# Patient Record
Sex: FEMALE | Race: WHITE | NOT HISPANIC OR LATINO | URBAN - NONMETROPOLITAN AREA
[De-identification: names, ages, dates, MRNs, and addresses within clinical notes are randomized per-mention and may not be internally consistent; named-entity substitution may affect disease eponyms.]

---

## 2017-01-01 ENCOUNTER — HOSPITAL ENCOUNTER (OUTPATIENT)
Dept: PHYSICAL THERAPY | Facility: HOSPITAL | Age: 47
Setting detail: THERAPIES SERIES
Discharge: HOME | End: 2017-01-20
Attending: ORTHOPAEDIC SURGERY | Admitting: ORTHOPAEDIC SURGERY

## 2017-01-09 ENCOUNTER — OFFICE VISIT (OUTPATIENT)
Dept: PAIN MEDICINE | Facility: CLINIC | Age: 47
End: 2017-01-09

## 2017-01-09 VITALS
SYSTOLIC BLOOD PRESSURE: 110 MMHG | DIASTOLIC BLOOD PRESSURE: 78 MMHG | WEIGHT: 163.8 LBS | BODY MASS INDEX: 24.26 KG/M2 | HEIGHT: 69 IN

## 2017-01-09 DIAGNOSIS — M25.561 RIGHT KNEE PAIN, UNSPECIFIED CHRONICITY: ICD-10-CM

## 2017-01-09 DIAGNOSIS — Z96.651 HX OF TOTAL KNEE REPLACEMENT, RIGHT: ICD-10-CM

## 2017-01-09 DIAGNOSIS — M79.18 MYOFACIAL MUSCLE PAIN: ICD-10-CM

## 2017-01-09 DIAGNOSIS — M47.817 LUMBOSACRAL SPONDYLOSIS WITHOUT MYELOPATHY: Primary | ICD-10-CM

## 2017-01-09 PROBLEM — Z96.659 HX OF TOTAL KNEE REPLACEMENT: Status: ACTIVE | Noted: 2017-01-09

## 2017-01-09 PROCEDURE — 99214 OFFICE O/P EST MOD 30 MIN: CPT | Performed by: NURSE PRACTITIONER

## 2017-01-09 RX ORDER — ERGOCALCIFEROL 1.25 MG/1
CAPSULE ORAL
Status: ON HOLD | COMMUNITY
Start: 2016-10-20 | End: 2021-03-15

## 2017-01-09 RX ORDER — CYCLOBENZAPRINE HCL 10 MG
10 TABLET ORAL 2 TIMES DAILY PRN
Qty: 60 TABLET | Refills: 2 | Status: SHIPPED | OUTPATIENT
Start: 2017-01-09 | End: 2017-06-02 | Stop reason: SDUPTHER

## 2017-01-09 RX ORDER — ZOLPIDEM TARTRATE 10 MG/1
TABLET ORAL
Status: ON HOLD | COMMUNITY
Start: 2016-12-01 | End: 2021-03-15

## 2017-01-09 RX ORDER — HYDROCODONE BITARTRATE AND ACETAMINOPHEN 5; 325 MG/1; MG/1
1 TABLET ORAL EVERY 6 HOURS PRN
COMMUNITY
End: 2021-03-17 | Stop reason: HOSPADM

## 2017-01-09 NOTE — MR AVS SNAPSHOT
Georgia Loyola   1/9/2017 11:20 AM   Office Visit    Dept Phone:  [phone]   Encounter #:  [id number]    Provider:  Tito Gonzalez MD   Department:  [facility] PAIN MANAGEMENT                Your Full Care Plan              Where to Get Your Medications      These medications were sent to [facility] - [place], [place] - [address] [place] [address] - [phone]  - [phone] [address], [facility] [address]     Phone:  [phone]     cyclobenzaprine 10 MG tablet            Your Updated Medication List          This list is accurate as of: 1/9/17 12:41 PM.  Always use your most recent med list.                cyclobenzaprine 10 MG tablet   Commonly known as:  FLEXERIL   Take 1 tablet by mouth 2 (Two) Times a Day As Needed for muscle spasms.       * HYDROcodone-acetaminophen 7.5-325 MG per tablet   Commonly known as:  NORCO       * HYDROcodone-acetaminophen 5-325 MG per tablet   Commonly known as:  NORCO       vitamin D 58588 UNITS capsule capsule   Commonly known as:  ERGOCALCIFEROL       zolpidem 10 MG tablet   Commonly known as:  AMBIEN       * Notice:  This list has 2 medication(s) that are the same as other medications prescribed for you. Read the directions carefully, and ask your doctor or other care provider to review them with you.            You Were Diagnosed With        Codes Comments    Lumbosacral spondylosis without myelopathy    -  Primary ICD-10-CM: M47.817  ICD-9-CM: 721.3     Myofacial muscle pain     ICD-10-CM: M79.1  ICD-9-CM: 729.1     Right knee pain, unspecified chronicity     ICD-10-CM: M25.561  ICD-9-CM: 719.46     Hx of total knee replacement, right     ICD-10-CM: Z96.651  ICD-9-CM: V43.65       Instructions     None    Patient Instructions History      Upcoming Appointments     Visit Type Date Time Department    FOLLOW UP 1/9/2017 11:20 AM [facility] PAIN [facility]    FOLLOW UP 2/7/2017 11:00 AM [facility]  "PAIN [facility]      MyChart Signup     Our [facility] that [facility]. If you would like to sign up for [facility], please email [facility] ([email or web address] or call [phone] to obtain an activation code.             Other Info from Your Visit           Your Appointments     Feb 07, 2017 11:00 AM CST   Follow Up with Tito Gonzalez MD   [facility]IN [facility] (--)    [address]  [facility] [address] [place] [id number] [phone]           Arrive 15 minutes prior to appointment.              Allergies     Toradol [Ketorolac Tromethamine]  Rash      Reason for Visit     Neck Pain     Back Pain lower    Hip Pain bilateral     Knee Pain bilateral    Foot Pain right      Vital Signs     Blood Pressure Height Weight Body Mass Index Smoking Status       110/78 (BP Location: Left arm, Patient Position: Sitting, Cuff Size: Adult) 69\" (175.3 cm) 163 lb 12.8 oz (74.3 kg) 24.19 kg/m2 Current Every Day Smoker       Problems and Diagnoses Noted     History of total knee replacement    Arthritis of low back    Muscle pain    Right knee pain        "

## 2017-01-09 NOTE — PROGRESS NOTES
Georgia Loyola is a 46 y.o. female.   1970    HPI:   Location: neck, lower back, bilateral hip, bilateral knee and right foot  Quality: burning, shooting, aching and throbbing  Severity: 8/10  Timing: constant  Alleviating: pain medication and injection  Aggravating: increased activity and weather    Pt needed PA and Urine to obtain RX of Norco 5mg x qid last visit.  Patient is to recover from right knee replacement surgery.  Patient states physical therapy has ended but she continues to physical therapy at home.  Right knee with approximated wound decreasing edema and patient is ambulating with a when necessary Cane.  Significant findings we will reduce vocal medication 5 mg 3 times a day to provide for better efficacy patient is in agreement with this after discussion Dr. Tito Gonzalez.  Medications providing enough relief of daily activity and ambulation no side effects are noted.  We will refill Flexeril at this present time.    The following portions of the patient's history were reviewed by me and updated as appropriate: allergies, current medications, past family history, past medical history, past social history, past surgical history and problem list.    Past Medical History   Diagnosis Date   • Anxiety    • Headache, tension-type    • Osteoarthritis        Social History     Social History   • Marital status:      Spouse name: N/A   • Number of children: N/A   • Years of education: N/A     Occupational History   • Not on file.     Social History Main Topics   • Smoking status: Current Every Day Smoker     Types: Cigarettes   • Smokeless tobacco: Not on file      Comment: 4 a day   • Alcohol use No   • Drug use: No   • Sexual activity: Defer     Other Topics Concern   • Not on file     Social History Narrative       Family History   Problem Relation Age of Onset   • Cancer Mother    • Diabetes Mother    • Early death Mother    • Coronary artery disease Mother    • Hyperlipidemia Father     • Hypertension Father          Current Outpatient Prescriptions:   •  cyclobenzaprine (FLEXERIL) 10 MG tablet, Take 1 tablet by mouth 2 (Two) Times a Day As Needed for muscle spasms., Disp: 60 tablet, Rfl: 2  •  HYDROcodone-acetaminophen (NORCO) 5-325 MG per tablet, Take 1 tablet by mouth Every 6 (Six) Hours As Needed., Disp: , Rfl:   •  vitamin D (ERGOCALCIFEROL) 57559 UNITS capsule capsule, , Disp: , Rfl:   •  zolpidem (AMBIEN) 10 MG tablet, , Disp: , Rfl:   •  HYDROcodone-acetaminophen (NORCO) 7.5-325 MG per tablet, Take 1 tablet by mouth Every 6 (Six) Hours As Needed for moderate pain (4-6)., Disp: , Rfl:     Allergies   Allergen Reactions   • Toradol [Ketorolac Tromethamine] Rash         Review of Systems   Musculoskeletal: Positive for back pain (lower) and neck pain.        Bilateral hip pain  Bilateral knee pain  Right foot pain       10 system review of systems was reviewed and negative except for above.    Physical Exam   Constitutional: She is oriented to person, place, and time. She appears well-developed and well-nourished. No distress.   Musculoskeletal:        Right knee: She exhibits decreased range of motion (slight decrease in ROM, post surgically) and swelling ( minimal swelling). She exhibits no erythema. Tenderness ( slight tenderness) found.        Lumbar back: She exhibits decreased range of motion (flex 60 deg and 10 deg ext with mild facet loading  ).        Legs:  Right knee post surgical scar well approximated   Neurological: She is alert and oriented to person, place, and time. No sensory deficit. Gait (PRN CANE, non today) abnormal.   Reflex Scores:       Tricep reflexes are 2+ on the right side and 2+ on the left side.       Bicep reflexes are 2+ on the right side and 2+ on the left side.       Brachioradialis reflexes are 2+ on the right side and 2+ on the left side.       Patellar reflexes are 1+ on the left side.       Achilles reflexes are 1+ on the left side.  Skin: Skin is warm  and dry.   Psychiatric: She has a normal mood and affect. Her behavior is normal. Judgment normal.       Georgia was seen today for neck pain, back pain, hip pain, knee pain and foot pain.    Diagnoses and all orders for this visit:    Lumbosacral spondylosis without myelopathy  -     cyclobenzaprine (FLEXERIL) 10 MG tablet; Take 1 tablet by mouth 2 (Two) Times a Day As Needed for muscle spasms.    Myofacial muscle pain  -     cyclobenzaprine (FLEXERIL) 10 MG tablet; Take 1 tablet by mouth 2 (Two) Times a Day As Needed for muscle spasms.    Right knee pain, unspecified chronicity  -     cyclobenzaprine (FLEXERIL) 10 MG tablet; Take 1 tablet by mouth 2 (Two) Times a Day As Needed for muscle spasms.    Hx of total knee replacement, right        Medication: Patient reports no negative side effects, Patient reports appropriate usage and storage habits and Patient's opioid provides enough reflief to be more active and perform activities of daily living with less discomfort. after discussion with patient and Dr. Tito Gonzalez Norco 5 mg 3 times a day for 1 month  given this is a reduction in dosage to better provide efficacy with improving right knee postop replacement and lower back chronic pain.     Interventional: none at this time. Follow up with Dr. Edouard, no manipulation needed with last visit. Released to go back to work Jan. 5, [employer].     Rehab: Pt has completed Right knee therapy on right knee. Pt does Home therapy for right knee.     Behavioral: No aberrant behavior noted. [facility] Report # [id number] was reviewed and is consistent with stated history    Urine drug screen None at this time.  We'll consider obtaining just next visit.          This document has been electronically signed by PRAVEEN Choi on January 9, 2017 3:30 PM          This document has been electronically signed by PRAVEEN Choi on January 9, 2017 3:30 PM

## 2017-01-12 NOTE — PROGRESS NOTES
Addendum:  I have reviewed this patient with PRAVEEN Evans.  I agree with the above findings and plan.

## 2017-02-07 ENCOUNTER — OFFICE VISIT (OUTPATIENT)
Dept: PAIN MEDICINE | Facility: CLINIC | Age: 47
End: 2017-02-07

## 2017-02-07 VITALS
DIASTOLIC BLOOD PRESSURE: 88 MMHG | WEIGHT: 162.3 LBS | BODY MASS INDEX: 24.04 KG/M2 | SYSTOLIC BLOOD PRESSURE: 116 MMHG | HEIGHT: 69 IN

## 2017-02-07 DIAGNOSIS — M47.817 LUMBOSACRAL SPONDYLOSIS WITHOUT MYELOPATHY: Primary | ICD-10-CM

## 2017-02-07 DIAGNOSIS — G89.29 CHRONIC PAIN OF RIGHT KNEE: ICD-10-CM

## 2017-02-07 DIAGNOSIS — M25.561 CHRONIC PAIN OF RIGHT KNEE: ICD-10-CM

## 2017-02-07 DIAGNOSIS — Z96.651 HX OF TOTAL KNEE REPLACEMENT, RIGHT: ICD-10-CM

## 2017-02-07 DIAGNOSIS — M79.18 MYOFACIAL MUSCLE PAIN: ICD-10-CM

## 2017-02-07 PROCEDURE — 99214 OFFICE O/P EST MOD 30 MIN: CPT | Performed by: NURSE PRACTITIONER

## 2017-02-07 RX ORDER — OMEPRAZOLE 20 MG/1
CAPSULE, DELAYED RELEASE ORAL
Status: ON HOLD | COMMUNITY
Start: 2016-11-01 | End: 2021-03-15

## 2017-02-07 NOTE — PROGRESS NOTES
"Georgia Loyola is a 46 y.o. female.   1970    HPI:   Location: neck, lower back, bilateral hip and bilateral knee and righ tfoot  Quality: burning, shooting, aching and throbbing  Severity: 8/10  Timing: constant  Alleviating: pain medication and and injections  Aggravating: increased activity and weather      Pt remains recovering from right knee replacement. Pt back to work. Pt with upcoming orthro appt March, pt states continuing right knee stretches. Pt states \"right knee remains limited\". Pt states working conditions are causing more pain and requests change in opiate medications.  Opiate medication providing enough relief for daily activities and work issues however duration remains minimal, we will increase medication Norco 5 mg 4 times a day.  No side effects are noted.  Patient happy with pain management regimen and treatment.    The following portions of the patient's history were reviewed by me and updated as appropriate: allergies, current medications, past family history, past medical history, past social history, past surgical history and problem list.    Past Medical History   Diagnosis Date   • Anxiety    • Headache, tension-type    • Osteoarthritis        Social History     Social History   • Marital status:      Spouse name: N/A   • Number of children: N/A   • Years of education: N/A     Occupational History   • Not on file.     Social History Main Topics   • Smoking status: Current Every Day Smoker     Types: Cigarettes   • Smokeless tobacco: Not on file      Comment: 4 a day   • Alcohol use No   • Drug use: No   • Sexual activity: Defer     Other Topics Concern   • Not on file     Social History Narrative       Family History   Problem Relation Age of Onset   • Cancer Mother    • Diabetes Mother    • Early death Mother    • Coronary artery disease Mother    • Hyperlipidemia Father    • Hypertension Father          Current Outpatient Prescriptions:   •  cyclobenzaprine " (FLEXERIL) 10 MG tablet, Take 1 tablet by mouth 2 (Two) Times a Day As Needed for muscle spasms., Disp: 60 tablet, Rfl: 2  •  HYDROcodone-acetaminophen (NORCO) 5-325 MG per tablet, Take 1 tablet by mouth Every 6 (Six) Hours As Needed., Disp: , Rfl:   •  omeprazole (priLOSEC) 20 MG capsule, , Disp: , Rfl:   •  vitamin D (ERGOCALCIFEROL) 04464 UNITS capsule capsule, , Disp: , Rfl:   •  HYDROcodone-acetaminophen (NORCO) 7.5-325 MG per tablet, Take 1 tablet by mouth Every 6 (Six) Hours As Needed for moderate pain (4-6)., Disp: , Rfl:   •  zolpidem (AMBIEN) 10 MG tablet, , Disp: , Rfl:     Allergies   Allergen Reactions   • Toradol [Ketorolac Tromethamine] Rash         Review of Systems   Musculoskeletal: Positive for back pain (lower) and neck pain.        Bilateral hip pain bilateral knee pain and right foot pain     10 system review of systems was reviewed and negative except for above.    Physical Exam   Constitutional: She is oriented to person, place, and time. She appears well-developed and well-nourished. No distress.   Eyes: EOM are normal. Pupils are equal, round, and reactive to light.   Musculoskeletal:        Right knee: She exhibits decreased range of motion (slight decrease in ROM, post surgically ) and swelling (minute swelling ). She exhibits no erythema. Tenderness ( slight tenderness ) found.        Lumbar back: She exhibits decreased range of motion (flex 60 deg and 10 deg ext with mild facet loading   ).        Legs:  Right knee wound healed   Neurological: She is alert and oriented to person, place, and time. No sensory deficit. Gait (PRN CANE, non today) abnormal.   Reflex Scores:       Tricep reflexes are 2+ on the right side and 2+ on the left side.       Bicep reflexes are 2+ on the right side and 2+ on the left side.       Brachioradialis reflexes are 2+ on the right side and 2+ on the left side.       Patellar reflexes are 0 on the right side and 1+ on the left side.       Achilles reflexes  "are 1+ on the right side and 1+ on the left side.  Skin: Skin is warm and dry.   Psychiatric: She has a normal mood and affect. Her behavior is normal. Judgment normal.   Vitals reviewed.      Georgia was seen today for neck pain, back pain, hip pain and knee pain.    Diagnoses and all orders for this visit:    Lumbosacral spondylosis without myelopathy    Myofacial muscle pain    Hx of total knee replacement, right    Chronic pain of right knee        Medication: Patient reports no negative side effects, Patient reports appropriate usage and storage habits, Patient's opioid provides enough reflief to be more active and perform activities of daily living with less discomfort. and Refill opioid medication as above. Flexeril providing enough relief for muscle spasms.  Patient states she is back at work and her duration medication is not lasting for only.  Discussed case Dr. Tito Partida, Wright 5 mg increased to 4 times a day, this is refilled per Dr. Brad Partida's 2 scripts.    Interventional: Pt released from PT, and continues to work. Pt with March appt with Javan, and right knee remains with limited mobility---no cane needed for ambulation. Smoking cessation discussed.  Patient started back to work we will increase her Norco 5 mg 4 times a day at this present time, patient understands that at some point this may be produced to original dosage and duration.     Rehab: Pt remains knee exercises at home and works 3 days a week.     Behavioral: No aberrant behavior noted. [facility] Report # [id number]  was reviewed and is consistent with stated history. Pt with recent loss of father and maintains \"doing well\"    Urine drug screen None at this time.          This document has been electronically signed by PRAVEEN Choi on February 7, 2017 7:32 PM          This document has been electronically signed by PRAVEEN Choi on February 7, 2017 7:32 PM     "

## 2017-02-17 NOTE — PROGRESS NOTES
Addendum:  I have reviewed this patient with PRAVEEN Evans.  I agree with the above findings and plan.  I have personally spoken with the patient today, and confirmed key findings.

## 2017-03-27 ENCOUNTER — OFFICE VISIT (OUTPATIENT)
Dept: PAIN MEDICINE | Facility: CLINIC | Age: 47
End: 2017-03-27

## 2017-03-27 VITALS
WEIGHT: 161.3 LBS | BODY MASS INDEX: 23.89 KG/M2 | DIASTOLIC BLOOD PRESSURE: 88 MMHG | HEIGHT: 69 IN | SYSTOLIC BLOOD PRESSURE: 120 MMHG

## 2017-03-27 DIAGNOSIS — M25.561 CHRONIC PAIN OF RIGHT KNEE: Primary | ICD-10-CM

## 2017-03-27 DIAGNOSIS — M47.817 LUMBOSACRAL SPONDYLOSIS WITHOUT MYELOPATHY: ICD-10-CM

## 2017-03-27 DIAGNOSIS — G89.29 CHRONIC PAIN OF RIGHT KNEE: Primary | ICD-10-CM

## 2017-03-27 DIAGNOSIS — M79.18 MYOFASCIAL PAIN: ICD-10-CM

## 2017-03-27 PROCEDURE — 99213 OFFICE O/P EST LOW 20 MIN: CPT | Performed by: PAIN MEDICINE

## 2017-03-27 RX ORDER — HYDROCODONE BITARTRATE AND ACETAMINOPHEN 7.5; 325 MG/1; MG/1
1 TABLET ORAL 4 TIMES DAILY
Qty: 60 TABLET | Refills: 0 | Status: SHIPPED | OUTPATIENT
Start: 2017-03-27 | End: 2017-04-11

## 2017-03-27 RX ORDER — AMOXICILLIN AND CLAVULANATE POTASSIUM 875; 125 MG/1; MG/1
TABLET, FILM COATED ORAL
COMMUNITY
Start: 2017-03-22 | End: 2017-08-02

## 2017-03-27 NOTE — PROGRESS NOTES
Georgia Loyola is a 46 y.o. female.   1970    HPI:   Location: neck, lower back, bilateral hip, bilateral knee and right foot  Quality: burning, shooting, aching and throbbing  Severity: 8/10  Timing: constant  Alleviating: pain medication and injection  Aggravating: increased activity and weather     Patient reports that the opioid medication still provides her relief , however, not as good as previously.  It does allow increased activity than she would have without the opioid medication.  She denies side effects.  Still having knee pain.  Still seeing ortho.  They are working her up.        The following portions of the patient's history were reviewed by me and updated as appropriate: allergies, current medications, past family history, past medical history, past social history, past surgical history and problem list.    Past Medical History:   Diagnosis Date   • Anxiety    • Headache, tension-type    • Osteoarthritis        Social History     Social History   • Marital status:      Spouse name: N/A   • Number of children: N/A   • Years of education: N/A     Occupational History   • Not on file.     Social History Main Topics   • Smoking status: Current Every Day Smoker     Types: Cigarettes   • Smokeless tobacco: Not on file      Comment: 4 a day   • Alcohol use No   • Drug use: No   • Sexual activity: Defer     Other Topics Concern   • Not on file     Social History Narrative       Family History   Problem Relation Age of Onset   • Cancer Mother    • Diabetes Mother    • Early death Mother    • Coronary artery disease Mother    • Hyperlipidemia Father    • Hypertension Father          Current Outpatient Prescriptions:   •  amoxicillin-clavulanate (AUGMENTIN) 875-125 MG per tablet, , Disp: , Rfl:   •  cyclobenzaprine (FLEXERIL) 10 MG tablet, Take 1 tablet by mouth 2 (Two) Times a Day As Needed for muscle spasms., Disp: 60 tablet, Rfl: 2  •  HYDROcodone-acetaminophen (NORCO) 5-325 MG per  tablet, Take 1 tablet by mouth Every 6 (Six) Hours As Needed., Disp: , Rfl:   •  HYDROcodone-acetaminophen (NORCO) 7.5-325 MG per tablet, Take 1 tablet by mouth Every 6 (Six) Hours As Needed for moderate pain (4-6)., Disp: , Rfl:   •  omeprazole (priLOSEC) 20 MG capsule, , Disp: , Rfl:   •  vitamin D (ERGOCALCIFEROL) 60098 UNITS capsule capsule, , Disp: , Rfl:   •  zolpidem (AMBIEN) 10 MG tablet, , Disp: , Rfl:   •  HYDROcodone-acetaminophen (NORCO) 7.5-325 MG per tablet, Take 1 tablet by mouth 4 (Four) Times a Day for 30 days., Disp: 60 tablet, Rfl: 0  •  HYDROcodone-acetaminophen (NORCO) 7.5-325 MG per tablet, Take 1 tablet by mouth 4 (Four) Times a Day for 30 days., Disp: 60 tablet, Rfl: 0  •  HYDROcodone-acetaminophen (NORCO) 7.5-325 MG per tablet, Take 1 tablet by mouth 4 (Four) Times a Day for 30 days., Disp: 60 tablet, Rfl: 0  •  HYDROcodone-acetaminophen (NORCO) 7.5-325 MG per tablet, Take 1 tablet by mouth 4 (Four) Times a Day for 30 days., Disp: 60 tablet, Rfl: 0    Allergies   Allergen Reactions   • Toradol [Ketorolac Tromethamine] Rash         Review of Systems   Musculoskeletal: Positive for back pain (lower) and neck pain.        Bilateral hip knee pain and right foot pain     10 system review of systems was reviewed and negative except for above.    Physical Exam   Constitutional: She appears well-developed and well-nourished. No distress.   Musculoskeletal:   Full arom l knees with no pain    Right knee with flexion slightly limited with pain.   Neurological: She is alert.   Psychiatric: She has a normal mood and affect. Her behavior is normal. Judgment normal.       Georgia was seen today for neck pain, back pain, hip pain, knee pain and foot pain.    Diagnoses and all orders for this visit:    Chronic pain of right knee    Lumbosacral spondylosis without myelopathy    Myofascial pain    Other orders  -     HYDROcodone-acetaminophen (NORCO) 7.5-325 MG per tablet; Take 1 tablet by mouth 4 (Four)  Times a Day for 30 days.  -     HYDROcodone-acetaminophen (NORCO) 7.5-325 MG per tablet; Take 1 tablet by mouth 4 (Four) Times a Day for 30 days.  -     HYDROcodone-acetaminophen (NORCO) 7.5-325 MG per tablet; Take 1 tablet by mouth 4 (Four) Times a Day for 30 days.  -     HYDROcodone-acetaminophen (NORCO) 7.5-325 MG per tablet; Take 1 tablet by mouth 4 (Four) Times a Day for 30 days.      Medication: Patient reports no negative side effects, Patient reports appropriate usage and storage habits, Patient's opioid provides enough reflief to be more active and perform activities of daily living with less discomfort. and Refill opioid medication as above.  Refill with an increase from 5mg to 7.5 in order to increase efficacy.    Interventional: none at this time    Rehab: none at this time    Behavioral: No aberrant behavior noted. [facility] Report #[id number] was reviewed and is consistent with stated history    Urine drug screen None at this time          This document has been electronically signed by Tito Gonzalez MD on March 27, 2017 11:22 AM

## 2017-04-11 RX ORDER — HYDROCODONE BITARTRATE AND ACETAMINOPHEN 7.5; 325 MG/1; MG/1
1 TABLET ORAL 4 TIMES DAILY
Qty: 120 TABLET | Refills: 0 | Status: SHIPPED | OUTPATIENT
Start: 2017-04-11 | End: 2017-05-11

## 2017-06-02 ENCOUNTER — APPOINTMENT (OUTPATIENT)
Dept: LAB | Facility: HOSPITAL | Age: 47
End: 2017-06-02

## 2017-06-02 ENCOUNTER — OFFICE VISIT (OUTPATIENT)
Dept: PAIN MEDICINE | Facility: CLINIC | Age: 47
End: 2017-06-02

## 2017-06-02 VITALS
DIASTOLIC BLOOD PRESSURE: 80 MMHG | BODY MASS INDEX: 23.4 KG/M2 | HEIGHT: 69 IN | WEIGHT: 158 LBS | SYSTOLIC BLOOD PRESSURE: 140 MMHG

## 2017-06-02 DIAGNOSIS — Z79.899 HIGH RISK MEDICATIONS (NOT ANTICOAGULANTS) LONG-TERM USE: ICD-10-CM

## 2017-06-02 DIAGNOSIS — M25.561 RIGHT KNEE PAIN, UNSPECIFIED CHRONICITY: ICD-10-CM

## 2017-06-02 DIAGNOSIS — M47.817 LUMBOSACRAL SPONDYLOSIS WITHOUT MYELOPATHY: ICD-10-CM

## 2017-06-02 DIAGNOSIS — G89.29 CHRONIC PAIN OF RIGHT KNEE: Primary | ICD-10-CM

## 2017-06-02 DIAGNOSIS — M79.18 MYOFACIAL MUSCLE PAIN: ICD-10-CM

## 2017-06-02 DIAGNOSIS — M25.561 CHRONIC PAIN OF RIGHT KNEE: Primary | ICD-10-CM

## 2017-06-02 PROCEDURE — 80307 DRUG TEST PRSMV CHEM ANLYZR: CPT | Performed by: PAIN MEDICINE

## 2017-06-02 PROCEDURE — G0481 DRUG TEST DEF 8-14 CLASSES: HCPCS | Performed by: PAIN MEDICINE

## 2017-06-02 PROCEDURE — 99214 OFFICE O/P EST MOD 30 MIN: CPT | Performed by: NURSE PRACTITIONER

## 2017-06-02 RX ORDER — HYDROCODONE BITARTRATE AND ACETAMINOPHEN 7.5; 325 MG/1; MG/1
1 TABLET ORAL 4 TIMES DAILY
Qty: 120 TABLET | Refills: 0 | Status: SHIPPED | OUTPATIENT
Start: 2017-06-02 | End: 2017-07-02

## 2017-06-02 RX ORDER — CYCLOBENZAPRINE HCL 10 MG
10 TABLET ORAL 2 TIMES DAILY PRN
Qty: 60 TABLET | Refills: 2 | Status: SHIPPED | OUTPATIENT
Start: 2017-06-02 | End: 2017-10-03 | Stop reason: SDUPTHER

## 2017-06-02 NOTE — PROGRESS NOTES
Georgia Loyola is a 46 y.o. female.   1970    HPI:   Location: neck, lower back, bilateral hip, bilateral knee and right foot  Quality: burning, shooting, aching and throbbing  Severity: 8/10  Timing: constant  Alleviating: pain medication and injection  Aggravating: increased activity and weather    Still trying to be more active.  No side effects.  Patient reports that the opioid medication still provides her good relief and allows increased activity than she would have without the opioid medication.  She denies side effects.      The following portions of the patient's history were reviewed by me and updated as appropriate: allergies, current medications, past family history, past medical history, past social history, past surgical history and problem list.    Past Medical History:   Diagnosis Date   • Anxiety    • Headache, tension-type    • Osteoarthritis        Social History     Social History   • Marital status:      Spouse name: N/A   • Number of children: N/A   • Years of education: N/A     Occupational History   • Not on file.     Social History Main Topics   • Smoking status: Current Every Day Smoker     Types: Cigarettes   • Smokeless tobacco: Never Used      Comment: 4 a day   • Alcohol use No   • Drug use: No   • Sexual activity: Defer     Other Topics Concern   • Not on file     Social History Narrative       Family History   Problem Relation Age of Onset   • Cancer Mother    • Diabetes Mother    • Early death Mother    • Coronary artery disease Mother    • Hyperlipidemia Father    • Hypertension Father          Current Outpatient Prescriptions:   •  cyclobenzaprine (FLEXERIL) 10 MG tablet, Take 1 tablet by mouth 2 (Two) Times a Day As Needed for Muscle Spasms., Disp: 60 tablet, Rfl: 2  •  HYDROcodone-acetaminophen (NORCO) 7.5-325 MG per tablet, Take 1 tablet by mouth Every 6 (Six) Hours As Needed for moderate pain (4-6)., Disp: , Rfl:   •  omeprazole (priLOSEC) 20 MG capsule, ,  Disp: , Rfl:   •  vitamin D (ERGOCALCIFEROL) 08555 UNITS capsule capsule, , Disp: , Rfl:   •  amoxicillin-clavulanate (AUGMENTIN) 875-125 MG per tablet, , Disp: , Rfl:   •  HYDROcodone-acetaminophen (NORCO) 5-325 MG per tablet, Take 1 tablet by mouth Every 6 (Six) Hours As Needed., Disp: , Rfl:   •  HYDROcodone-acetaminophen (NORCO) 7.5-325 MG per tablet, Take 1 tablet by mouth 4 (Four) Times a Day for 30 days., Disp: 120 tablet, Rfl: 0  •  HYDROcodone-acetaminophen (NORCO) 7.5-325 MG per tablet, Take 1 tablet by mouth 4 (Four) Times a Day for 30 days., Disp: 120 tablet, Rfl: 0  •  zolpidem (AMBIEN) 10 MG tablet, , Disp: , Rfl:     Allergies   Allergen Reactions   • Toradol [Ketorolac Tromethamine] Rash       Review of Systems   Musculoskeletal: Positive for back pain and neck pain.        B.hip b.knee and r.foot pain     All other systems reviewed and are negative.    All systems reviewed and negative except for above.    Physical Exam   Constitutional: She appears well-developed and well-nourished. No distress.   Musculoskeletal:        Lumbar back: She exhibits decreased range of motion (flexion 45 deg and ext 5-10 deg) and tenderness.   Full arom l knees with no pain    Right knee with flexion and ext essentially full.    Neurological: She is alert.   Psychiatric: She has a normal mood and affect. Her behavior is normal. Judgment normal.       Georgia was seen today for back pain, neck pain and pain.    Diagnoses and all orders for this visit:    Chronic pain of right knee  -     ToxASSURE Select 13 (MW)    High risk medications (not anticoagulants) long-term use  -     ToxASSURE Select 13 (MW)    Lumbosacral spondylosis without myelopathy  -     ToxASSURE Select 13 (MW)  -     cyclobenzaprine (FLEXERIL) 10 MG tablet; Take 1 tablet by mouth 2 (Two) Times a Day As Needed for Muscle Spasms.    Myofacial muscle pain  -     cyclobenzaprine (FLEXERIL) 10 MG tablet; Take 1 tablet by mouth 2 (Two) Times a Day As Needed  for Muscle Spasms.    Right knee pain, unspecified chronicity  -     cyclobenzaprine (FLEXERIL) 10 MG tablet; Take 1 tablet by mouth 2 (Two) Times a Day As Needed for Muscle Spasms.    Other orders  -     HYDROcodone-acetaminophen (NORCO) 7.5-325 MG per tablet; Take 1 tablet by mouth 4 (Four) Times a Day for 30 days.  -     HYDROcodone-acetaminophen (NORCO) 7.5-325 MG per tablet; Take 1 tablet by mouth 4 (Four) Times a Day for 30 days.        Medication: Patient reports no negative side effects, Patient reports appropriate usage and storage habits, Patient's opioid provides enough reflief to be more active and perform activities of daily living with less discomfort. and Refill opioid medication as above.  Refill flexeril as well.    Interventional: none at this time.  May be candidate for lumbar interventions.  Still improving 6 mos after tkr.     Rehab: none at this time.  Remains active.     Behavioral: No aberrant behavior noted. [facility] Report #[id number]  was reviewed and is consistent with stated history    Urine drug screen Ordered today to test for drugs of abuse and prescribed medications.          This document has been electronically signed by Tito Gonzalez MD on June 2, 2017 11:09 AM

## 2017-06-09 LAB — CONV REPORT SUMMARY: NORMAL

## 2017-08-02 ENCOUNTER — OFFICE VISIT (OUTPATIENT)
Dept: PAIN MEDICINE | Facility: CLINIC | Age: 47
End: 2017-08-02

## 2017-08-02 VITALS
BODY MASS INDEX: 23.83 KG/M2 | HEIGHT: 69 IN | WEIGHT: 160.9 LBS | SYSTOLIC BLOOD PRESSURE: 120 MMHG | DIASTOLIC BLOOD PRESSURE: 64 MMHG

## 2017-08-02 DIAGNOSIS — M79.18 MYOFASCIAL PAIN: ICD-10-CM

## 2017-08-02 DIAGNOSIS — G89.29 CHRONIC PAIN OF RIGHT KNEE: Primary | ICD-10-CM

## 2017-08-02 DIAGNOSIS — Z79.899 HIGH RISK MEDICATIONS (NOT ANTICOAGULANTS) LONG-TERM USE: ICD-10-CM

## 2017-08-02 DIAGNOSIS — M25.561 CHRONIC PAIN OF RIGHT KNEE: Primary | ICD-10-CM

## 2017-08-02 DIAGNOSIS — M47.817 LUMBOSACRAL SPONDYLOSIS WITHOUT MYELOPATHY: ICD-10-CM

## 2017-08-02 PROCEDURE — 99214 OFFICE O/P EST MOD 30 MIN: CPT | Performed by: PAIN MEDICINE

## 2017-08-02 RX ORDER — GABAPENTIN 300 MG/1
300 CAPSULE ORAL NIGHTLY
Qty: 30 CAPSULE | Refills: 1 | Status: SHIPPED | OUTPATIENT
Start: 2017-08-02 | End: 2017-10-03 | Stop reason: SDUPTHER

## 2017-08-02 RX ORDER — HYDROCODONE BITARTRATE AND ACETAMINOPHEN 7.5; 325 MG/1; MG/1
1 TABLET ORAL 4 TIMES DAILY
Qty: 120 TABLET | Refills: 0 | Status: SHIPPED | OUTPATIENT
Start: 2017-08-02 | End: 2017-09-01

## 2017-08-02 RX ORDER — GABAPENTIN 300 MG/1
300 CAPSULE ORAL NIGHTLY
COMMUNITY
End: 2017-08-02 | Stop reason: SDUPTHER

## 2017-08-02 NOTE — PROGRESS NOTES
Georgia Loyola is a 46 y.o. female.   1970    HPI:   Location: lower back, bilateral hip, bilateral knee and right foot  Quality: burning, shooting, aching and throbbing  Severity: 8/10  Timing: constant  Alleviating: pain medication and injection  Aggravating: increased activity and weather     Still utilizing opioid medication to control her knee pain.  She is status post total knee replacement on the right.  She states that her orthopedist has drained that knee since I last saw her and thinks that this might help some.  Denies side effects the opioid medication.      The following portions of the patient's history were reviewed by me and updated as appropriate: allergies, current medications, past family history, past medical history, past social history, past surgical history and problem list.    Past Medical History:   Diagnosis Date   • Anxiety    • Headache, tension-type    • Osteoarthritis        Social History     Social History   • Marital status:      Spouse name: N/A   • Number of children: N/A   • Years of education: N/A     Occupational History   • Not on file.     Social History Main Topics   • Smoking status: Current Every Day Smoker     Types: Cigarettes   • Smokeless tobacco: Never Used      Comment: 4 a day   • Alcohol use No   • Drug use: No   • Sexual activity: Defer     Other Topics Concern   • Not on file     Social History Narrative       Family History   Problem Relation Age of Onset   • Cancer Mother    • Diabetes Mother    • Early death Mother    • Coronary artery disease Mother    • Hyperlipidemia Father    • Hypertension Father          Current Outpatient Prescriptions:   •  cyclobenzaprine (FLEXERIL) 10 MG tablet, Take 1 tablet by mouth 2 (Two) Times a Day As Needed for Muscle Spasms., Disp: 60 tablet, Rfl: 2  •  gabapentin (NEURONTIN) 300 MG capsule, Take 1 capsule by mouth Every Night., Disp: 30 capsule, Rfl: 1  •  HYDROcodone-acetaminophen (NORCO) 7.5-325 MG per  tablet, Take 1 tablet by mouth Every 6 (Six) Hours As Needed for moderate pain (4-6)., Disp: , Rfl:   •  HYDROcodone-acetaminophen (NORCO) 5-325 MG per tablet, Take 1 tablet by mouth Every 6 (Six) Hours As Needed., Disp: , Rfl:   •  HYDROcodone-acetaminophen (NORCO) 7.5-325 MG per tablet, Take 1 tablet by mouth 4 (Four) Times a Day for 30 days., Disp: 120 tablet, Rfl: 0  •  HYDROcodone-acetaminophen (NORCO) 7.5-325 MG per tablet, Take 1 tablet by mouth 4 (Four) Times a Day for 30 days., Disp: 120 tablet, Rfl: 0  •  omeprazole (priLOSEC) 20 MG capsule, , Disp: , Rfl:   •  vitamin D (ERGOCALCIFEROL) 45959 UNITS capsule capsule, , Disp: , Rfl:   •  zolpidem (AMBIEN) 10 MG tablet, , Disp: , Rfl:     Allergies   Allergen Reactions   • Toradol [Ketorolac Tromethamine] Rash       Review of Systems   Musculoskeletal: Positive for back pain (lower).        Bilateral hip pain  Bilateral knee pain  Right foot pain   All other systems reviewed and are negative.    All systems reviewed and negative except for above.    Physical Exam   Constitutional: She appears well-developed and well-nourished. No distress.   Musculoskeletal:        Lumbar back: She exhibits decreased range of motion (Deferred flexion and extension) and tenderness.   Right knee with flexion and ext essentially full.    Neurological: She is alert.   Psychiatric: She has a normal mood and affect. Her behavior is normal. Judgment normal.       Georgia was seen today for back pain, hip pain, knee pain and foot pain.    Diagnoses and all orders for this visit:    Chronic pain of right knee    High risk medications (not anticoagulants) long-term use    Lumbosacral spondylosis without myelopathy    Myofascial pain    Other orders  -     HYDROcodone-acetaminophen (NORCO) 7.5-325 MG per tablet; Take 1 tablet by mouth 4 (Four) Times a Day for 30 days.  -     HYDROcodone-acetaminophen (NORCO) 7.5-325 MG per tablet; Take 1 tablet by mouth 4 (Four) Times a Day for 30  days.  -     gabapentin (NEURONTIN) 300 MG capsule; Take 1 capsule by mouth Every Night.        Medication: Patient reports no negative side effects, Patient reports appropriate usage and storage habits, Patient's opioid provides enough reflief to be more active and perform activities of daily living with less discomfort. and Refill opioid medication as above    Interventional: none at this time    Rehab: none at this time    Behavioral: No aberrant behavior noted. [facility] Report #[id number]  was reviewed and is consistent with stated history    Urine drug screen Reviewed from last visit and is appropriate.          This document has been electronically signed by Tito Gonzalez MD on August 2, 2017 2:16 PM

## 2017-10-03 ENCOUNTER — OFFICE VISIT (OUTPATIENT)
Dept: PAIN MEDICINE | Facility: CLINIC | Age: 47
End: 2017-10-03

## 2017-10-03 VITALS
SYSTOLIC BLOOD PRESSURE: 120 MMHG | DIASTOLIC BLOOD PRESSURE: 64 MMHG | BODY MASS INDEX: 23.06 KG/M2 | WEIGHT: 155.7 LBS | HEIGHT: 69 IN

## 2017-10-03 DIAGNOSIS — M25.561 CHRONIC PAIN OF RIGHT KNEE: Primary | ICD-10-CM

## 2017-10-03 DIAGNOSIS — M79.18 MYOFACIAL MUSCLE PAIN: ICD-10-CM

## 2017-10-03 DIAGNOSIS — M47.817 LUMBOSACRAL SPONDYLOSIS WITHOUT MYELOPATHY: ICD-10-CM

## 2017-10-03 DIAGNOSIS — G89.29 CHRONIC PAIN OF RIGHT KNEE: Primary | ICD-10-CM

## 2017-10-03 DIAGNOSIS — Z79.899 HIGH RISK MEDICATIONS (NOT ANTICOAGULANTS) LONG-TERM USE: ICD-10-CM

## 2017-10-03 PROCEDURE — 99213 OFFICE O/P EST LOW 20 MIN: CPT | Performed by: PAIN MEDICINE

## 2017-10-03 RX ORDER — GABAPENTIN 300 MG/1
300 CAPSULE ORAL NIGHTLY
Qty: 30 CAPSULE | Refills: 1 | Status: ON HOLD | OUTPATIENT
Start: 2017-10-03 | End: 2021-03-15

## 2017-10-03 RX ORDER — HYDROCODONE BITARTRATE AND ACETAMINOPHEN 7.5; 325 MG/1; MG/1
1 TABLET ORAL 4 TIMES DAILY
Qty: 120 TABLET | Refills: 0 | Status: SHIPPED | OUTPATIENT
Start: 2017-10-03 | End: 2017-11-02

## 2017-10-03 RX ORDER — CYCLOBENZAPRINE HCL 10 MG
10 TABLET ORAL 2 TIMES DAILY PRN
Qty: 60 TABLET | Refills: 2 | Status: SHIPPED | OUTPATIENT
Start: 2017-10-03
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
## 2017-10-16 RX ORDER — GABAPENTIN 300 MG/1
CAPSULE ORAL
Qty: 30 CAPSULE | Refills: 0 | OUTPATIENT
Start: 2017-10-16

## 2017-11-07 DIAGNOSIS — M47.817 LUMBOSACRAL SPONDYLOSIS WITHOUT MYELOPATHY: ICD-10-CM

## 2017-11-07 DIAGNOSIS — M79.18 MYOFACIAL MUSCLE PAIN: ICD-10-CM

## 2017-11-08 RX ORDER — CYCLOBENZAPRINE HCL 10 MG
TABLET ORAL
Qty: 60 TABLET | Refills: 0 | OUTPATIENT
Start: 2017-11-08

## 2017-12-01 ENCOUNTER — OFFICE VISIT (OUTPATIENT)
Dept: PAIN MEDICINE | Facility: CLINIC | Age: 47
End: 2017-12-01

## 2017-12-01 VITALS
HEIGHT: 69 IN | DIASTOLIC BLOOD PRESSURE: 64 MMHG | WEIGHT: 168.3 LBS | SYSTOLIC BLOOD PRESSURE: 120 MMHG | BODY MASS INDEX: 24.93 KG/M2

## 2017-12-01 DIAGNOSIS — M79.18 MYOFACIAL MUSCLE PAIN: ICD-10-CM

## 2017-12-01 DIAGNOSIS — G89.29 CHRONIC PAIN OF RIGHT KNEE: Primary | ICD-10-CM

## 2017-12-01 DIAGNOSIS — M25.561 CHRONIC PAIN OF RIGHT KNEE: Primary | ICD-10-CM

## 2017-12-01 DIAGNOSIS — F17.200 SMOKING: ICD-10-CM

## 2017-12-01 DIAGNOSIS — Z96.651 HISTORY OF TOTAL RIGHT KNEE REPLACEMENT: ICD-10-CM

## 2017-12-01 DIAGNOSIS — M47.817 LUMBOSACRAL SPONDYLOSIS WITHOUT MYELOPATHY: ICD-10-CM

## 2017-12-01 PROCEDURE — 99213 OFFICE O/P EST LOW 20 MIN: CPT | Performed by: NURSE PRACTITIONER
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
## 2021-03-15 ENCOUNTER — HOSPITAL ENCOUNTER (OUTPATIENT)
Facility: HOSPITAL | Age: 51
Discharge: HOME | End: 2021-03-17
Attending: HOSPITALIST | Admitting: UROLOGY

## 2021-03-15 ENCOUNTER — ANESTHESIA EVENT (OUTPATIENT)
Dept: PERIOP | Facility: HOSPITAL | Age: 51
End: 2021-03-15

## 2021-03-15 ENCOUNTER — APPOINTMENT (OUTPATIENT)
Dept: GENERAL RADIOLOGY | Facility: HOSPITAL | Age: 51
End: 2021-03-15

## 2021-03-15 ENCOUNTER — ANESTHESIA (OUTPATIENT)
Dept: PERIOP | Facility: HOSPITAL | Age: 51
End: 2021-03-15

## 2021-03-15 DIAGNOSIS — N20.1 RIGHT URETERAL STONE: Primary | ICD-10-CM

## 2021-03-15 LAB
ANION GAP SERPL CALCULATED.3IONS-SCNC: 12 MMOL/L (ref 5–15)
BASOPHILS # BLD AUTO: 0.09 10*3/MM3 (ref 0–0.2)
BASOPHILS NFR BLD AUTO: 0.4 % (ref 0–1.5)
BUN SERPL-MCNC: 14 MG/DL (ref 6–20)
BUN/CREAT SERPL: 15.2 (ref 7–25)
CALCIUM SPEC-SCNC: 8.8 MG/DL (ref 8.6–10.5)
CHLORIDE SERPL-SCNC: 103 MMOL/L (ref 98–107)
CO2 SERPL-SCNC: 22 MMOL/L (ref 22–29)
CREAT SERPL-MCNC: 0.92 MG/DL (ref 0.57–1)
DEPRECATED RDW RBC AUTO: 45.3 FL (ref 37–54)
EOSINOPHIL # BLD AUTO: 0 10*3/MM3 (ref 0–0.4)
EOSINOPHIL NFR BLD AUTO: 0 % (ref 0.3–6.2)
ERYTHROCYTE [DISTWIDTH] IN BLOOD BY AUTOMATED COUNT: 13.5 % (ref 12.3–15.4)
GFR SERPL CREATININE-BSD FRML MDRD: 65 ML/MIN/1.73
GLUCOSE SERPL-MCNC: 100 MG/DL (ref 65–99)
HCT VFR BLD AUTO: 42.5 % (ref 34–46.6)
HGB BLD-MCNC: 14.2 G/DL (ref 12–15.9)
IMM GRANULOCYTES # BLD AUTO: 0.21 10*3/MM3 (ref 0–0.05)
IMM GRANULOCYTES NFR BLD AUTO: 0.9 % (ref 0–0.5)
LYMPHOCYTES # BLD AUTO: 1 10*3/MM3 (ref 0.7–3.1)
LYMPHOCYTES NFR BLD AUTO: 4.1 % (ref 19.6–45.3)
MCH RBC QN AUTO: 30.5 PG (ref 26.6–33)
MCHC RBC AUTO-ENTMCNC: 33.4 G/DL (ref 31.5–35.7)
MCV RBC AUTO: 91.4 FL (ref 79–97)
MONOCYTES # BLD AUTO: 1.29 10*3/MM3 (ref 0.1–0.9)
MONOCYTES NFR BLD AUTO: 5.3 % (ref 5–12)
NEUTROPHILS NFR BLD AUTO: 21.88 10*3/MM3 (ref 1.7–7)
NEUTROPHILS NFR BLD AUTO: 89.3 % (ref 42.7–76)
NRBC BLD AUTO-RTO: 0 /100 WBC (ref 0–0.2)
PLATELET # BLD AUTO: 289 10*3/MM3 (ref 140–450)
PMV BLD AUTO: 10.2 FL (ref 6–12)
POTASSIUM SERPL-SCNC: 4.1 MMOL/L (ref 3.5–5.2)
RBC # BLD AUTO: 4.65 10*6/MM3 (ref 3.77–5.28)
RBC MORPH BLD: NORMAL
SMALL PLATELETS BLD QL SMEAR: ADEQUATE
SODIUM SERPL-SCNC: 137 MMOL/L (ref 136–145)
WBC # BLD AUTO: 24.47 10*3/MM3 (ref 3.4–10.8)
WBC MORPH BLD: NORMAL

## 2021-03-15 PROCEDURE — 25010000002 FENTANYL CITRATE (PF) 100 MCG/2ML SOLUTION: Performed by: NURSE ANESTHETIST, CERTIFIED REGISTERED

## 2021-03-15 PROCEDURE — 87086 URINE CULTURE/COLONY COUNT: CPT | Performed by: UROLOGY

## 2021-03-15 PROCEDURE — 74420 UROGRAPHY RTRGR +-KUB: CPT

## 2021-03-15 PROCEDURE — 74018 RADEX ABDOMEN 1 VIEW: CPT

## 2021-03-15 PROCEDURE — 96365 THER/PROPH/DIAG IV INF INIT: CPT

## 2021-03-15 PROCEDURE — C2617 STENT, NON-COR, TEM W/O DEL: HCPCS | Performed by: UROLOGY

## 2021-03-15 PROCEDURE — C1769 GUIDE WIRE: HCPCS | Performed by: UROLOGY

## 2021-03-15 PROCEDURE — 25010000002 IOPAMIDOL 61 % SOLUTION: Performed by: UROLOGY

## 2021-03-15 PROCEDURE — 25010000002 MIDAZOLAM PER 1 MG: Performed by: NURSE ANESTHETIST, CERTIFIED REGISTERED

## 2021-03-15 PROCEDURE — 80048 BASIC METABOLIC PNL TOTAL CA: CPT | Performed by: NURSE PRACTITIONER

## 2021-03-15 PROCEDURE — 25010000002 ONDANSETRON PER 1 MG: Performed by: NURSE ANESTHETIST, CERTIFIED REGISTERED

## 2021-03-15 PROCEDURE — 87077 CULTURE AEROBIC IDENTIFY: CPT | Performed by: UROLOGY

## 2021-03-15 PROCEDURE — C1758 CATHETER, URETERAL: HCPCS | Performed by: UROLOGY

## 2021-03-15 PROCEDURE — 85007 BL SMEAR W/DIFF WBC COUNT: CPT | Performed by: NURSE PRACTITIONER

## 2021-03-15 PROCEDURE — 96361 HYDRATE IV INFUSION ADD-ON: CPT

## 2021-03-15 PROCEDURE — G0378 HOSPITAL OBSERVATION PER HR: HCPCS

## 2021-03-15 PROCEDURE — 25010000002 MORPHINE PER 10 MG: Performed by: NURSE PRACTITIONER

## 2021-03-15 PROCEDURE — 25010000003 LIDOCAINE 1 % SOLUTION: Performed by: NURSE ANESTHETIST, CERTIFIED REGISTERED

## 2021-03-15 PROCEDURE — 85025 COMPLETE CBC W/AUTO DIFF WBC: CPT | Performed by: NURSE PRACTITIONER

## 2021-03-15 PROCEDURE — 25010000002 CEFTRIAXONE PER 250 MG: Performed by: NURSE PRACTITIONER

## 2021-03-15 PROCEDURE — 96376 TX/PRO/DX INJ SAME DRUG ADON: CPT

## 2021-03-15 PROCEDURE — 25010000002 ONDANSETRON PER 1 MG: Performed by: UROLOGY

## 2021-03-15 PROCEDURE — 25010000002 PROPOFOL 10 MG/ML EMULSION: Performed by: NURSE ANESTHETIST, CERTIFIED REGISTERED

## 2021-03-15 PROCEDURE — 87186 SC STD MICRODIL/AGAR DIL: CPT | Performed by: UROLOGY

## 2021-03-15 PROCEDURE — 25010000002 MORPHINE PER 10 MG: Performed by: UROLOGY

## 2021-03-15 PROCEDURE — 96375 TX/PRO/DX INJ NEW DRUG ADDON: CPT

## 2021-03-15 DEVICE — URETERAL STENT
Type: IMPLANTABLE DEVICE | Site: URETER | Status: FUNCTIONAL
Brand: POLARIS™ ULTRA

## 2021-03-15 RX ORDER — SODIUM CHLORIDE 0.9 % (FLUSH) 0.9 %
10 SYRINGE (ML) INJECTION EVERY 12 HOURS SCHEDULED
Status: DISCONTINUED | OUTPATIENT
Start: 2021-03-15 | End: 2021-03-17 | Stop reason: HOSPADM

## 2021-03-15 RX ORDER — NICOTINE 21 MG/24HR
1 PATCH, TRANSDERMAL 24 HOURS TRANSDERMAL EVERY 24 HOURS
Status: DISCONTINUED | OUTPATIENT
Start: 2021-03-15 | End: 2021-03-17 | Stop reason: HOSPADM

## 2021-03-15 RX ORDER — CYCLOBENZAPRINE HCL 10 MG
10 TABLET ORAL 2 TIMES DAILY PRN
Status: DISCONTINUED | OUTPATIENT
Start: 2021-03-15 | End: 2021-03-17 | Stop reason: HOSPADM

## 2021-03-15 RX ORDER — ONDANSETRON 2 MG/ML
4 INJECTION INTRAMUSCULAR; INTRAVENOUS EVERY 6 HOURS PRN
Status: DISCONTINUED | OUTPATIENT
Start: 2021-03-15 | End: 2021-03-17 | Stop reason: HOSPADM

## 2021-03-15 RX ORDER — PROPOFOL 10 MG/ML
VIAL (ML) INTRAVENOUS AS NEEDED
Status: DISCONTINUED | OUTPATIENT
Start: 2021-03-15 | End: 2021-03-15 | Stop reason: SURG

## 2021-03-15 RX ORDER — BISACODYL 5 MG/1
5 TABLET, DELAYED RELEASE ORAL DAILY PRN
Status: DISCONTINUED | OUTPATIENT
Start: 2021-03-15 | End: 2021-03-17 | Stop reason: HOSPADM

## 2021-03-15 RX ORDER — FENTANYL CITRATE 50 UG/ML
INJECTION, SOLUTION INTRAMUSCULAR; INTRAVENOUS AS NEEDED
Status: DISCONTINUED | OUTPATIENT
Start: 2021-03-15 | End: 2021-03-15 | Stop reason: SURG

## 2021-03-15 RX ORDER — NALOXONE HCL 0.4 MG/ML
0.4 VIAL (ML) INJECTION
Status: DISCONTINUED | OUTPATIENT
Start: 2021-03-15 | End: 2021-03-16

## 2021-03-15 RX ORDER — TAMSULOSIN HYDROCHLORIDE 0.4 MG/1
0.4 CAPSULE ORAL DAILY
Status: DISCONTINUED | OUTPATIENT
Start: 2021-03-15 | End: 2021-03-17 | Stop reason: HOSPADM

## 2021-03-15 RX ORDER — HYDROCODONE BITARTRATE AND ACETAMINOPHEN 7.5; 325 MG/1; MG/1
1 TABLET ORAL EVERY 6 HOURS PRN
Status: DISCONTINUED | OUTPATIENT
Start: 2021-03-15 | End: 2021-03-17 | Stop reason: HOSPADM

## 2021-03-15 RX ORDER — LANOLIN ALCOHOL/MO/W.PET/CERES
5 CREAM (GRAM) TOPICAL NIGHTLY PRN
Status: DISCONTINUED | OUTPATIENT
Start: 2021-03-15 | End: 2021-03-17 | Stop reason: HOSPADM

## 2021-03-15 RX ORDER — MORPHINE SULFATE 10 MG/ML
4 INJECTION INTRAMUSCULAR; INTRAVENOUS; SUBCUTANEOUS
Status: DISCONTINUED | OUTPATIENT
Start: 2021-03-15 | End: 2021-03-16

## 2021-03-15 RX ORDER — NALOXONE HCL 0.4 MG/ML
0.4 VIAL (ML) INJECTION
Status: DISCONTINUED | OUTPATIENT
Start: 2021-03-15 | End: 2021-03-16 | Stop reason: SDUPTHER

## 2021-03-15 RX ORDER — SODIUM CHLORIDE 9 MG/ML
100 INJECTION, SOLUTION INTRAVENOUS CONTINUOUS
Status: DISCONTINUED | OUTPATIENT
Start: 2021-03-15 | End: 2021-03-17 | Stop reason: HOSPADM

## 2021-03-15 RX ORDER — KETAMINE HCL IN NACL, ISO-OSM 100MG/10ML
SYRINGE (ML) INJECTION AS NEEDED
Status: DISCONTINUED | OUTPATIENT
Start: 2021-03-15 | End: 2021-03-15 | Stop reason: SURG

## 2021-03-15 RX ORDER — MORPHINE SULFATE 2 MG/ML
2 INJECTION, SOLUTION INTRAMUSCULAR; INTRAVENOUS EVERY 4 HOURS PRN
Status: DISCONTINUED | OUTPATIENT
Start: 2021-03-15 | End: 2021-03-15 | Stop reason: SDUPTHER

## 2021-03-15 RX ORDER — SODIUM CHLORIDE 0.9 % (FLUSH) 0.9 %
10 SYRINGE (ML) INJECTION AS NEEDED
Status: DISCONTINUED | OUTPATIENT
Start: 2021-03-15 | End: 2021-03-17 | Stop reason: HOSPADM

## 2021-03-15 RX ORDER — ONDANSETRON 2 MG/ML
4 INJECTION INTRAMUSCULAR; INTRAVENOUS ONCE AS NEEDED
Status: DISCONTINUED | OUTPATIENT
Start: 2021-03-15 | End: 2021-03-15 | Stop reason: HOSPADM

## 2021-03-15 RX ORDER — MIDAZOLAM HYDROCHLORIDE 1 MG/ML
INJECTION INTRAMUSCULAR; INTRAVENOUS AS NEEDED
Status: DISCONTINUED | OUTPATIENT
Start: 2021-03-15 | End: 2021-03-15 | Stop reason: SURG

## 2021-03-15 RX ORDER — ONDANSETRON 2 MG/ML
INJECTION INTRAMUSCULAR; INTRAVENOUS AS NEEDED
Status: DISCONTINUED | OUTPATIENT
Start: 2021-03-15 | End: 2021-03-15 | Stop reason: SURG

## 2021-03-15 RX ORDER — ONDANSETRON 4 MG/1
4 TABLET, FILM COATED ORAL EVERY 6 HOURS PRN
Status: DISCONTINUED | OUTPATIENT
Start: 2021-03-15 | End: 2021-03-17 | Stop reason: HOSPADM

## 2021-03-15 RX ORDER — ACETAMINOPHEN 325 MG/1
650 TABLET ORAL EVERY 4 HOURS PRN
Status: DISCONTINUED | OUTPATIENT
Start: 2021-03-15 | End: 2021-03-17 | Stop reason: HOSPADM

## 2021-03-15 RX ORDER — LIDOCAINE HYDROCHLORIDE 10 MG/ML
INJECTION, SOLUTION INFILTRATION; PERINEURAL AS NEEDED
Status: DISCONTINUED | OUTPATIENT
Start: 2021-03-15 | End: 2021-03-15 | Stop reason: SURG

## 2021-03-15 RX ORDER — FLUOXETINE HYDROCHLORIDE 20 MG/1
20 CAPSULE ORAL DAILY
Status: DISCONTINUED | OUTPATIENT
Start: 2021-03-15 | End: 2021-03-17 | Stop reason: HOSPADM

## 2021-03-15 RX ADMIN — SODIUM CHLORIDE 100 ML/HR: 9 INJECTION, SOLUTION INTRAVENOUS at 23:22

## 2021-03-15 RX ADMIN — HYDROCODONE BITARTRATE AND ACETAMINOPHEN 1 TABLET: 7.5; 325 TABLET ORAL at 17:37

## 2021-03-15 RX ADMIN — MORPHINE SULFATE 4 MG: 10 INJECTION INTRAVENOUS at 23:11

## 2021-03-15 RX ADMIN — PROPOFOL 200 MG: 10 INJECTION, EMULSION INTRAVENOUS at 21:35

## 2021-03-15 RX ADMIN — LIDOCAINE HYDROCHLORIDE 50 MG: 10 INJECTION, SOLUTION INFILTRATION; PERINEURAL at 21:35

## 2021-03-15 RX ADMIN — MORPHINE SULFATE 2 MG: 2 INJECTION, SOLUTION INTRAMUSCULAR; INTRAVENOUS at 18:39

## 2021-03-15 RX ADMIN — Medication 10 MG: at 21:54

## 2021-03-15 RX ADMIN — MIDAZOLAM HYDROCHLORIDE 2 MG: 2 INJECTION, SOLUTION INTRAMUSCULAR; INTRAVENOUS at 21:31

## 2021-03-15 RX ADMIN — Medication 10 MG: at 21:57

## 2021-03-15 RX ADMIN — ONDANSETRON 4 MG: 2 INJECTION INTRAMUSCULAR; INTRAVENOUS at 23:11

## 2021-03-15 RX ADMIN — FLUOXETINE 20 MG: 20 CAPSULE ORAL at 14:23

## 2021-03-15 RX ADMIN — FENTANYL CITRATE 25 MCG: 50 INJECTION INTRAMUSCULAR; INTRAVENOUS at 21:42

## 2021-03-15 RX ADMIN — CEFTRIAXONE SODIUM 1 G: 1 INJECTION, POWDER, FOR SOLUTION INTRAMUSCULAR; INTRAVENOUS at 15:34

## 2021-03-15 RX ADMIN — FENTANYL CITRATE 25 MCG: 50 INJECTION INTRAMUSCULAR; INTRAVENOUS at 21:47

## 2021-03-15 RX ADMIN — FENTANYL CITRATE 25 MCG: 50 INJECTION INTRAMUSCULAR; INTRAVENOUS at 21:34

## 2021-03-15 RX ADMIN — FENTANYL CITRATE 25 MCG: 50 INJECTION INTRAMUSCULAR; INTRAVENOUS at 21:53

## 2021-03-15 RX ADMIN — MORPHINE SULFATE 2 MG: 2 INJECTION, SOLUTION INTRAMUSCULAR; INTRAVENOUS at 14:20

## 2021-03-15 RX ADMIN — TAMSULOSIN HYDROCHLORIDE 0.4 MG: 0.4 CAPSULE ORAL at 14:23

## 2021-03-15 RX ADMIN — ONDANSETRON 4 MG: 2 INJECTION INTRAMUSCULAR; INTRAVENOUS at 21:35

## 2021-03-15 RX ADMIN — SODIUM CHLORIDE 100 ML/HR: 9 INJECTION, SOLUTION INTRAVENOUS at 14:17

## 2021-03-15 NOTE — PLAN OF CARE
Goal Outcome Evaluation:  Plan of Care Reviewed With: patient  Progress: no change  Outcome Summary: transfer from [facility]. prn pain meds given and effective. NPO status. will continue to monitor

## 2021-03-15 NOTE — CONSULTS
[facility]   Consult Note    Patient Name: Georgia Loyola  : 1970  MRN: [id number]  Primary Care Physician: Provider, No Known  Referring Physician: Angel Jade MD  Date of admission: 3/15/2021    Inpatient Urology Consult  Consult performed by: Guerita Hough APRN  Consult ordered by: Pao Valles APRN  Reason for consult: ureter stone        Subjective   Subjective     Reason for Consult/ Chief Complaint: right ureter stone    Georgia Loyola is a 50-year-old female consulted to Urology for 9 mm right ureter stone causing right flank to RLQ pain with nausea and vomiting, pain is severe, last episode of vomiting this morning at 0730. First stone 20 years ago, last stone passed 7 years ago. TMAX 99.8.     Flank Pain  This is a new problem. The current episode started yesterday. The problem occurs constantly. The problem has been gradually worsening since onset. The pain is present in the lumbar spine. The quality of the pain is described as stabbing. The pain is severe. The symptoms are aggravated by position. Stiffness is present all day. Associated symptoms include abdominal pain, dysuria and a fever. Pertinent negatives include no weakness. She has tried bed rest and analgesics for the symptoms. The treatment provided no relief.       Review of Systems   Constitutional: Positive for fever. Negative for activity change, appetite change, chills and fatigue.   HENT: Negative.    Eyes: Negative.    Respiratory: Negative.    Cardiovascular: Negative.    Gastrointestinal: Positive for abdominal pain, nausea and vomiting.   Endocrine: Negative.    Genitourinary: Positive for dysuria and flank pain. Negative for decreased urine volume and hematuria.   Musculoskeletal: Negative for arthralgias and gait problem.   Skin: Negative.    Allergic/Immunologic: Negative.    Neurological: Negative.  Negative for weakness.   Hematological: Negative.    Psychiatric/Behavioral: Negative.          Personal History     Past Medical History:   Diagnosis Date   • Anxiety    • Headache, tension-type    • Osteoarthritis        Past Surgical History:   Procedure Laterality Date   • FOOT SURGERY     • KNEE ARTHROSCOPY         Family History: family history includes Cancer in her mother; Coronary artery disease in her mother; Diabetes in her mother; Early death in her mother; Hyperlipidemia in her father; Hypertension in her father. Otherwise pertinent FHx was reviewed and not pertinent to current issue.    Social History:  reports that she has been smoking cigarettes. She has never used smokeless tobacco. She reports that she does not drink alcohol and does not use drugs.    Home Medications:  HYDROcodone-acetaminophen and cyclobenzaprine      Allergies:  Allergies   Allergen Reactions   • Toradol [Ketorolac Tromethamine] Rash       Objective    Objective   Vitals:  Temp:  [99.4 °F (37.4 °C)-99.8 °F (37.7 °C)] 99.4 °F (37.4 °C)  Heart Rate:  [109] 109  Resp:  [18] 18  BP: (139-142)/(71-77) 139/77    Physical Exam  Vitals reviewed.   Constitutional:       General: She is not in acute distress.     Appearance: She is not toxic-appearing or diaphoretic.   HENT:      Head: Normocephalic and atraumatic.      Right Ear: External ear normal.      Left Ear: External ear normal.      Nose: Nose normal.      Mouth/Throat:      Mouth: Mucous membranes are moist.   Eyes:      General: No scleral icterus.        Right eye: No discharge.         Left eye: No discharge.      Pupils: Pupils are equal, round, and reactive to light.   Cardiovascular:      Rate and Rhythm: Normal rate.   Pulmonary:      Effort: Pulmonary effort is normal. No respiratory distress.      Breath sounds: Normal breath sounds.   Abdominal:      General: There is no distension.      Palpations: Abdomen is soft.      Tenderness: There is no abdominal tenderness. There is right CVA tenderness. There is no left CVA tenderness.   Musculoskeletal:          General: No swelling or deformity. Normal range of motion.      Right lower leg: No edema.      Left lower leg: No edema.   Skin:     General: Skin is warm and dry.      Capillary Refill: Capillary refill takes less than 2 seconds.      Coloration: Skin is not pale.   Neurological:      General: No focal deficit present.      Mental Status: She is alert and oriented to person, place, and time. Mental status is at baseline.   Psychiatric:         Mood and Affect: Mood normal.         Behavior: Behavior normal.         Result Review    Result Review:  I have personally reviewed the results from the time of this admission to 3/15/2021 18:26 CDT and agree with these findings:  [x]  Laboratory  []  Microbiology  []  Radiology  []  EKG/Telemetry   []  Cardiology/Vascular   []  Pathology  []  Old records  []  Other:  Most notable findings include: leukocytosis. We need to review radiology imaging to confirm stone location and stone side.     Assessment/Plan   Assessment / Plan     Brief Patient Summary:  Goergia Loyola is a 50 y.o. female who was transferred to [facility] for 9 mm right UPJ stone causing flank pain and nausea/vomiting.     Active Hospital Problems:  Active Hospital Problems    Diagnosis    • Right ureteral stone        Plan:   KUB STAT.  NPO until Dr. Rodríguez reviews imaging.   She will need RIGHT CRULLS, tonight or tomorrow.   Urine culture, continue Rocephin.     Electronically signed by PRAVEEN Elizabeth, 03/15/21, 6:26 PM CDT.

## 2021-03-15 NOTE — H&P
[facility] Admission      Date of Admission: 3/15/2021      Primary Care Physician: Provider, No Known      Chief Complaint: right flank pain, nausea, vomiting    HPI: 50 year old female with past medical history of anxiety, osteoarthritis, chronic back pain who presented as a transfer from [facility] on 3/15/2021 with complaints of right flank pain, nausea, and vomiting that started around 8 pm the night prior.  She reports history of kidney stones at least three times in the past.  Imaging at transferring facility revealed 9 x 7 mm right ureteral stone for which she was sent to our facility for urology evaluation.     Concurrent Medical History:  has a past medical history of Anxiety, Headache, tension-type, and Osteoarthritis.    Past Surgical History:  has a past surgical history that includes Knee Arthroscopy and Foot surgery.    Family History: family history includes Cancer in her mother; Coronary artery disease in her mother; Diabetes in her mother; Early death in her mother; Hyperlipidemia in her father; Hypertension in her father.    Social History: denies alcohol or illicit drug use.  She reports she currently smokes 1/2-1 pack per day.    Allergies:   Allergies   Allergen Reactions   • Toradol [Ketorolac Tromethamine] Rash       Medications:   Prior to Admission medications    Medication Sig Start Date End Date Taking? Authorizing Provider   HYDROcodone-acetaminophen (NORCO) 7.5-325 MG per tablet Take 1 tablet by mouth Every 6 (Six) Hours As Needed for moderate pain (4-6).   Yes Holly Rogers MD   cyclobenzaprine (FLEXERIL) 10 MG tablet Take 1 tablet by mouth 2 (Two) Times a Day As Needed for Muscle Spasms. 10/3/17   Tito Gonzalez MD   HYDROcodone-acetaminophen (NORCO) 5-325 MG per tablet Take 1 tablet by mouth Every 6 (Six) Hours As Needed.    ProviderHolly MD   gabapentin (NEURONTIN) 300 MG capsule Take 1  capsule by mouth Every Night. 10/3/17 3/15/21  Tito Gonzalez MD   omeprazole (priLOSEC) 20 MG capsule  11/1/16 3/15/21  ProviderHolly MD   vitamin D (ERGOCALCIFEROL) 78607 UNITS capsule capsule  10/20/16 3/15/21  Holly Rogers MD   zolpidem (AMBIEN) 10 MG tablet  12/1/16 3/15/21  Provider, MD Holly       Review of Systems:  Review of Systems   Constitutional: Negative for chills, fatigue and fever.   HENT: Negative for congestion, rhinorrhea and sore throat.    Respiratory: Negative for cough, chest tightness, shortness of breath and wheezing.    Cardiovascular: Negative for chest pain, palpitations and leg swelling.   Gastrointestinal: Positive for nausea and vomiting. Negative for abdominal distention and abdominal pain.   Genitourinary: Positive for flank pain. Negative for dysuria, hematuria and urgency.   Musculoskeletal: Positive for back pain. Negative for neck pain.   Skin: Negative for pallor.   Neurological: Negative for dizziness, weakness and headaches.   Psychiatric/Behavioral: Negative for confusion. The patient is not nervous/anxious.             Physical Exam:   Temp:  [99.8 °F (37.7 °C)] 99.8 °F (37.7 °C)  Heart Rate:  [109] 109  Resp:  [18] 18  BP: (142)/(71) 142/71  Physical Exam  Vitals and nursing note reviewed.   Constitutional:       General: She is not in acute distress.     Appearance: Normal appearance.   HENT:      Head: Normocephalic and atraumatic.      Right Ear: External ear normal.      Left Ear: External ear normal.      Nose: Nose normal.      Mouth/Throat:      Mouth: Mucous membranes are moist.      Pharynx: Oropharynx is clear.   Eyes:      General: No scleral icterus.        Right eye: No discharge.         Left eye: No discharge.      Conjunctiva/sclera: Conjunctivae normal.   Cardiovascular:      Rate and Rhythm: Normal rate and regular rhythm.      Pulses: Normal pulses.      Heart sounds: Normal heart sounds. No murmur. No friction rub. No gallop.     Pulmonary:      Effort: Pulmonary effort is normal. No respiratory distress.      Breath sounds: Normal breath sounds. No stridor. No wheezing, rhonchi or rales.   Abdominal:      General: Bowel sounds are normal. There is no distension.      Palpations: Abdomen is soft.      Tenderness: There is no abdominal tenderness.   Musculoskeletal:         General: No swelling. Normal range of motion.      Cervical back: Normal range of motion and neck supple.   Skin:     General: Skin is warm and dry.   Neurological:      General: No focal deficit present.      Mental Status: She is alert and oriented to person, place, and time.   Psychiatric:         Mood and Affect: Mood normal.         Behavior: Behavior normal.           Results Reviewed:  I have personally reviewed current lab, radiology, and data and agree with results.  Lab Results (last 24 hours)     ** No results found for the last 24 hours. **        Imaging Results (Last 24 Hours)     ** No results found for the last 24 hours. **            Assessment:    Active Hospital Problems    Diagnosis    • Right ureteral stone              Plan:  1. Right ureteral stone: awaiting evaluation by urology.  Dr. Rodríguez consulted.  NPO, IV fluids, Flomax, pain control.  Urinalysis with culture.  Rocephin ordered.  Strain all urine.    2. Chronic pain: continue home dose of Norco, PRN Flexeril.  MAIRA reviewed.   3. Anxiety/depression: continue Prozac.    Further orders will depend upon hospital course.  Plan of care discussed with patient.  Code status confirmed with patient and her wishes are for full code status in the event of emergency.  She designates her sister in law, Guerline Wright, as her decision maker in the event of emergency.        This document has been electronically signed by PRAVEEN Smith on March 15, 2021 14:27 CDT

## 2021-03-16 ENCOUNTER — APPOINTMENT (OUTPATIENT)
Dept: GENERAL RADIOLOGY | Facility: HOSPITAL | Age: 51
End: 2021-03-16

## 2021-03-16 LAB
ANION GAP SERPL CALCULATED.3IONS-SCNC: 9 MMOL/L (ref 5–15)
BASOPHILS # BLD AUTO: 0.09 10*3/MM3 (ref 0–0.2)
BASOPHILS NFR BLD AUTO: 0.4 % (ref 0–1.5)
BUN SERPL-MCNC: 13 MG/DL (ref 6–20)
BUN/CREAT SERPL: 15.1 (ref 7–25)
CALCIUM SPEC-SCNC: 8.8 MG/DL (ref 8.6–10.5)
CHLORIDE SERPL-SCNC: 103 MMOL/L (ref 98–107)
CO2 SERPL-SCNC: 25 MMOL/L (ref 22–29)
CREAT SERPL-MCNC: 0.86 MG/DL (ref 0.57–1)
DEPRECATED RDW RBC AUTO: 45.9 FL (ref 37–54)
EOSINOPHIL # BLD AUTO: 0.03 10*3/MM3 (ref 0–0.4)
EOSINOPHIL NFR BLD AUTO: 0.1 % (ref 0.3–6.2)
ERYTHROCYTE [DISTWIDTH] IN BLOOD BY AUTOMATED COUNT: 13.8 % (ref 12.3–15.4)
GFR SERPL CREATININE-BSD FRML MDRD: 70 ML/MIN/1.73
GLUCOSE SERPL-MCNC: 107 MG/DL (ref 65–99)
HCT VFR BLD AUTO: 38.4 % (ref 34–46.6)
HGB BLD-MCNC: 13.1 G/DL (ref 12–15.9)
IMM GRANULOCYTES # BLD AUTO: 0.14 10*3/MM3 (ref 0–0.05)
IMM GRANULOCYTES NFR BLD AUTO: 0.6 % (ref 0–0.5)
LYMPHOCYTES # BLD AUTO: 1.53 10*3/MM3 (ref 0.7–3.1)
LYMPHOCYTES NFR BLD AUTO: 6.7 % (ref 19.6–45.3)
MCH RBC QN AUTO: 31 PG (ref 26.6–33)
MCHC RBC AUTO-ENTMCNC: 34.1 G/DL (ref 31.5–35.7)
MCV RBC AUTO: 90.8 FL (ref 79–97)
MONOCYTES # BLD AUTO: 1.29 10*3/MM3 (ref 0.1–0.9)
MONOCYTES NFR BLD AUTO: 5.7 % (ref 5–12)
NEUTROPHILS NFR BLD AUTO: 19.72 10*3/MM3 (ref 1.7–7)
NEUTROPHILS NFR BLD AUTO: 86.5 % (ref 42.7–76)
NRBC BLD AUTO-RTO: 0 /100 WBC (ref 0–0.2)
PLATELET # BLD AUTO: 228 10*3/MM3 (ref 140–450)
PMV BLD AUTO: 10.3 FL (ref 6–12)
POTASSIUM SERPL-SCNC: 4 MMOL/L (ref 3.5–5.2)
RBC # BLD AUTO: 4.23 10*6/MM3 (ref 3.77–5.28)
SODIUM SERPL-SCNC: 137 MMOL/L (ref 136–145)
WBC # BLD AUTO: 22.8 10*3/MM3 (ref 3.4–10.8)

## 2021-03-16 PROCEDURE — 25010000002 MORPHINE PER 10 MG: Performed by: UROLOGY

## 2021-03-16 PROCEDURE — 74018 RADEX ABDOMEN 1 VIEW: CPT

## 2021-03-16 PROCEDURE — 85025 COMPLETE CBC W/AUTO DIFF WBC: CPT | Performed by: UROLOGY

## 2021-03-16 PROCEDURE — 25010000002 CEFTRIAXONE PER 250 MG: Performed by: UROLOGY

## 2021-03-16 PROCEDURE — G0378 HOSPITAL OBSERVATION PER HR: HCPCS

## 2021-03-16 PROCEDURE — 80048 BASIC METABOLIC PNL TOTAL CA: CPT | Performed by: UROLOGY

## 2021-03-16 PROCEDURE — 87040 BLOOD CULTURE FOR BACTERIA: CPT | Performed by: NURSE PRACTITIONER

## 2021-03-16 RX ORDER — NALOXONE HCL 0.4 MG/ML
0.4 VIAL (ML) INJECTION
Status: DISCONTINUED | OUTPATIENT
Start: 2021-03-16 | End: 2021-03-17 | Stop reason: HOSPADM

## 2021-03-16 RX ADMIN — MORPHINE SULFATE 4 MG: 4 INJECTION INTRAVENOUS at 05:20

## 2021-03-16 RX ADMIN — ACETAMINOPHEN 650 MG: 325 TABLET, FILM COATED ORAL at 20:00

## 2021-03-16 RX ADMIN — TAMSULOSIN HYDROCHLORIDE 0.4 MG: 0.4 CAPSULE ORAL at 08:33

## 2021-03-16 RX ADMIN — SODIUM CHLORIDE 100 ML/HR: 9 INJECTION, SOLUTION INTRAVENOUS at 09:53

## 2021-03-16 RX ADMIN — HYDROCODONE BITARTRATE AND ACETAMINOPHEN 1 TABLET: 7.5; 325 TABLET ORAL at 22:18

## 2021-03-16 RX ADMIN — MORPHINE SULFATE 4 MG: 4 INJECTION INTRAVENOUS at 10:52

## 2021-03-16 RX ADMIN — FLUOXETINE 20 MG: 20 CAPSULE ORAL at 08:32

## 2021-03-16 RX ADMIN — MORPHINE SULFATE 4 MG: 10 INJECTION INTRAVENOUS at 01:26

## 2021-03-16 RX ADMIN — MORPHINE SULFATE 4 MG: 4 INJECTION INTRAVENOUS at 14:49

## 2021-03-16 RX ADMIN — SODIUM CHLORIDE, PRESERVATIVE FREE 10 ML: 5 INJECTION INTRAVENOUS at 08:33

## 2021-03-16 RX ADMIN — HYDROCODONE BITARTRATE AND ACETAMINOPHEN 1 TABLET: 7.5; 325 TABLET ORAL at 10:10

## 2021-03-16 RX ADMIN — SODIUM CHLORIDE, PRESERVATIVE FREE 10 ML: 5 INJECTION INTRAVENOUS at 22:19

## 2021-03-16 RX ADMIN — CEFTRIAXONE SODIUM 2 G: 2 INJECTION, POWDER, FOR SOLUTION INTRAMUSCULAR; INTRAVENOUS at 14:49

## 2021-03-16 RX ADMIN — SODIUM CHLORIDE 100 ML/HR: 9 INJECTION, SOLUTION INTRAVENOUS at 22:07

## 2021-03-16 RX ADMIN — MORPHINE SULFATE 4 MG: 4 INJECTION INTRAVENOUS at 19:36

## 2021-03-16 NOTE — ANESTHESIA PREPROCEDURE EVALUATION
Anesthesia Evaluation     Patient summary reviewed   NPO Solid Status: > 8 hours  NPO Liquid Status: > 2 hours           Airway   Mallampati: II  TM distance: >3 FB  Neck ROM: full  Dental      Pulmonary     breath sounds clear to auscultation  (+) a smoker Current Smoked day of surgery,   Cardiovascular - negative cardio ROS    Rhythm: regular  Rate: normal        Neuro/Psych  (+) headaches, psychiatric history Anxiety,     GI/Hepatic/Renal/Endo    (+)   renal disease stones,     Musculoskeletal     (+) arthralgias, back pain, chronic pain,   Abdominal    Substance History      OB/GYN          Other   arthritis,                    Anesthesia Plan    ASA 2 - emergent     general     intravenous induction     Anesthetic plan, all risks, benefits, and alternatives have been provided, discussed and informed consent has been obtained with: patient.

## 2021-03-16 NOTE — PLAN OF CARE
Goal Outcome Evaluation:  Plan of Care Reviewed With: patient  Progress: no change  Outcome Summary: Patient still complaining of right flank pain. no nausea reported. elizabeth cath in place with adequate urine output (pink in color). v/s are stable. will work on pain mangement

## 2021-03-16 NOTE — OP NOTE
CYSTOSCOPY URETEROSCOPY RETROGRADE PYELOGRAM HOLMIUM LASER STENT INSERTION  Procedure Note    Georgia Loyola  3/15/2021    Pre-op Diagnosis:   Right ureteral stone [N20.1]    Post-op Diagnosis:     Post-Op Diagnosis Codes:     * Right ureteral stone [N20.1]    Procedure(s):  CYSTOSCOPY RIGHT RETROGRADE PYELOGRAM STENT INSERTION    Surgeon(s):  Kieran Rodríguez MD    Anesthesia: Choice    Staff:   Circulator: Smiley Richards RN  Radiology Technologist: Kieran Bedolla  Scrub Person: Sohail Butler Pam          Estimated Blood Loss: none    Specimens:                ID Type Source Tests Collected by Time   1 (Not marked as sent) :  Urine Urine, Catheter URINE CULTURE Kieran Rodríguez MD 3/15/2021 2156         Drains: * No LDAs found *    Findings: Hydro-ureteral nephrosis proximal, second to a 7 x 9 mm proximal ureteral stone    Complications: None    Indications: Same    Description of Procedure: Patient brought the operating suite placed in dorsolithotomy position.  Sterile prep and drape genitalia routine fashion passed on 22 cystoscope in the bladder right ureter was catheterized 6 cc of contrast placed up the ureter I put contrast right below the ureteral stone which is totally occlusive I tried 035 Glidewire as well as 038 Glidewire's and finally got a get past the obstructing stone in the right renal pelvis I put retrograde catheter in the pelvis and kate urine out of the renal pelvis to be noted that extravasated on peripheral perforation prior to that.  Then with the 038 guidewire back in the right renal pelvis over the top guidewire through cystoscope placed a 6 x 28 double-J stent.  Bladder was drained scope was removed patient taken recovery out of procedure well after anchored #16 Estrada catheter 10 cc placed in theballoon    Kieran Rodríguez MD     Date: 3/15/2021  Time: 22:05 CDT

## 2021-03-16 NOTE — PROGRESS NOTES
[facility]  INPATIENT PROGRESS NOTE    Length of Stay: 0  Date of Admission: 3/15/2021  Primary Care Physician: Provider, No Known    Subjective   Chief Complaint: right flank pain  HPI:  50 year old female with past medical history of anxiety, osteoarthritis, chronic back pain who presented as a transfer from [facility] on 3/15/2021 with complaints of right flank pain, nausea, and vomiting that started around 8 pm the night prior.  She reports history of kidney stones at least three times in the past.  Imaging at transferring facility revealed 9 x 7 mm right ureteral stone for which she was sent to our facility for urology evaluation. She underwent cystoscopy and J stent yesterday evening.  She reports right flank pain and nausea continue today but are improving.     Review of Systems   Constitutional: Negative for chills and fever.   HENT: Negative for congestion, rhinorrhea and sore throat.    Respiratory: Negative for cough, chest tightness, shortness of breath and wheezing.    Cardiovascular: Negative for chest pain, palpitations and leg swelling.   Gastrointestinal: Positive for nausea. Negative for abdominal pain, diarrhea and vomiting.   Genitourinary: Positive for flank pain.   Musculoskeletal: Positive for back pain. Negative for neck pain.   Skin: Negative for pallor.   Neurological: Negative for dizziness, weakness and light-headedness.   Psychiatric/Behavioral: Negative for confusion. The patient is not nervous/anxious.         All pertinent negatives and positives are as above. All other systems have been reviewed and are negative unless otherwise stated.     Objective    Temp:  [97.5 °F (36.4 °C)-99.8 °F (37.7 °C)] 97.9 °F (36.6 °C)  Heart Rate:  [107-125] 115  Resp:  [14-20] 18  BP: ()/(52-77) 120/60    Physical Exam  Vitals and nursing note reviewed.   Constitutional:       General: She is not in acute distress.      Appearance: Normal appearance. She is not ill-appearing.   HENT:      Head: Normocephalic and atraumatic.      Right Ear: External ear normal.      Left Ear: External ear normal.      Nose: Nose normal.      Mouth/Throat:      Mouth: Mucous membranes are moist.      Pharynx: Oropharynx is clear.   Eyes:      General: No scleral icterus.        Right eye: No discharge.         Left eye: No discharge.      Conjunctiva/sclera: Conjunctivae normal.   Cardiovascular:      Rate and Rhythm: Normal rate and regular rhythm.      Pulses: Normal pulses.      Heart sounds: Normal heart sounds. No murmur. No friction rub. No gallop.    Pulmonary:      Effort: Pulmonary effort is normal. No respiratory distress.      Breath sounds: Normal breath sounds. No stridor. No wheezing, rhonchi or rales.   Abdominal:      General: Bowel sounds are normal. There is no distension.      Palpations: Abdomen is soft.      Tenderness: There is no abdominal tenderness.   Musculoskeletal:         General: No swelling. Normal range of motion.      Cervical back: Normal range of motion and neck supple.   Skin:     General: Skin is warm and dry.   Neurological:      General: No focal deficit present.      Mental Status: She is alert and oriented to person, place, and time.   Psychiatric:         Mood and Affect: Mood normal.         Behavior: Behavior normal.             Results Review:  I have reviewed the labs, radiology results, and diagnostic studies.    Laboratory Data:   Results from last 7 days   Lab Units 03/16/21  0533 03/15/21  1431   SODIUM mmol/L 137 137   POTASSIUM mmol/L 4.0 4.1   CHLORIDE mmol/L 103 103   CO2 mmol/L 25.0 22.0   BUN mg/dL 13 14   CREATININE mg/dL 0.86 0.92   GLUCOSE mg/dL 107* 100*   CALCIUM mg/dL 8.8 8.8   ANION GAP mmol/L 9.0 12.0     CrCl cannot be calculated (Unknown ideal weight.).          Results from last 7 days   Lab Units 03/16/21  0533 03/15/21  1431   WBC 10*3/mm3 22.80* 24.47*   HEMOGLOBIN g/dL 13.1 14.2    HEMATOCRIT % 38.4 42.5   PLATELETS 10*3/mm3 228 289           Culture Data:   No results found for: BLOODCX  No results found for: URINECX  No results found for: RESPCX  No results found for: WOUNDCX  No results found for: STOOLCX  No components found for: BODYFLD    Radiology Data:   Imaging Results (Last 24 Hours)     Procedure Component Value Units Date/Time    XR Abdomen KUB [[id number]] Collected: 03/16/21 0941     Updated: 03/16/21 0959    Narrative:      PROCEDURE: XR ABDOMEN KUB    VIEWS:  1    INDICATION:Abdominal pain/discomfort    COMPARISON: 3/15/2021    FINDINGS:      - Bowel gas pattern: Nonobstructive    - Free air: None    - Soft tissue:No gross evidence of organomegaly,      an abdominal mass,or ascites    - Calculi: 0.94 cm calculus projected over the right renal  shadow and adjacent to the proximal loop of right-sided double-J  ureteral stent, distal loop of which terminates in the pelvis.  Contrast has drained from the right renal collecting system    - Osseous:Limited assessment, unremarkable for age.    - Misc: IUD present and pelvis        Impression:        1. New right double-J ureteral stent  2. Presumed right nephrolithiasis  3. IUD in pelvis    Electronically signed by:  Nhi Boone MD  3/16/2021 9:58 AM CDT  Workstation: [id number]    FL Retrograde Pyelogram In OR [[id number]] Resulted: 03/16/21 0716     Updated: 03/16/21 0716    XR Abdomen KUB [[id number]] Collected: 03/15/21 1847     Updated: 03/15/21 1908    Narrative:      Exam:  KUB    History:  Right ureteral calculus.    Supine film of the abdomen was obtained.    Comparison:  None    Contrast opacifies a minimally to moderately dilated right  collecting system and proximal right ureter.  Contrast present within a normal caliber mid to distal right  ureter and residual contrast in the urinary bladder.  Persistent right nephrogram.  No mechanical bowel obstruction.  No organomegaly.  No acute osseous abnormality.  IUD in place.       Impression:      Conclusion:  Right-sided obstructive uropathy presumably related to an  incompletely obstructing calculus in the proximal right ureter at  the L3 level    [id number]    Electronically signed by:  Leandro Knapp MD  3/15/2021 7:07 PM CDT  Workstation: [id number]          I have reviewed the patient's current medications.     Assessment/Plan     Active Hospital Problems    Diagnosis    • Right ureteral stone        Plan:      1. Right ureteral stone and acute cystitis: s/p right J stent placement 3/16 with urology.  [place] following. Continue IV fluids, Flomax, pain control.  Continue Rocephin and follow cultures.   2. Chronic pain: continue home dose of Norco, PRN Flexeril.  MAIRA reviewed.   3. Anxiety/depression: continue Prozac.        This document has been electronically signed by PRAVEEN Smith on March 16, 2021 10:36 CDT

## 2021-03-16 NOTE — PLAN OF CARE
Problem: Adult Inpatient Plan of Care  Goal: Plan of Care Review  Outcome: Ongoing, Progressing   Goal Outcome Evaluation:         n/v on admit- s/p cysto w/ stent placement. Reg diet, no intakes available. Current wt 149#.  Pt reports that shes eats 1, maybe 2 meals most days but there are days she doesnt eat at all. No reason for not eating. She reports UBW lower 150s and that she may have lost some weight recently. Discussed importance of good nutrition and reviewed healthy food choices, encourage 2-3 meals/day or several small meals thru day. RD following.

## 2021-03-16 NOTE — ANESTHESIA PROCEDURE NOTES
Airway  Urgency: elective    Date/Time: 3/15/2021 9:37 PM    General Information and Staff    Patient location during procedure: OR  CRNA: Jone Augustin CRNA    Indications and Patient Condition  Indications for airway management: airway protection    Preoxygenated: yes  Mask difficulty assessment: 0 - not attempted    Final Airway Details  Final airway type: supraglottic airway      Successful airway: I-gel  Size 4    Number of attempts at approach: 1  Assessment: lips, teeth, and gum same as pre-op and atraumatic intubation

## 2021-03-16 NOTE — NURSING NOTE
Patient transferred from PACU via gurney appears asleep easily aroused v/s taken elevated HR noted. F/c intact draining pink colored urine. Condition remains stable. Will continue to monitor.

## 2021-03-16 NOTE — ANESTHESIA POSTPROCEDURE EVALUATION
Patient: Georgia Loyola    Procedure Summary     Date: 03/15/21 Room / Location: [facility] OR 02 / [facility] OR    Anesthesia Start: 2131 Anesthesia Stop: 2216    Procedure: CYSTOSCOPY RIGHT RETROGRADE PYELOGRAM STENT INSERTION (Right ) Diagnosis:       Right ureteral stone      (Right ureteral stone [N20.1])    Surgeons: Marcos, Kieran ALVAREZ MD Provider: Jone Augustin CRNA    Anesthesia Type: general ASA Status: 2 - Emergent          Anesthesia Type: general    Vitals  No vitals data found for the desired time range.          Post Anesthesia Care and Evaluation    Patient location during evaluation: PACU  Level of consciousness: sleepy but conscious  Pain score: 0  Pain management: adequate  Airway patency: patent  Anesthetic complications: No anesthetic complications  PONV Status: none  Cardiovascular status: acceptable and hemodynamically stable  Respiratory status: acceptable and spontaneous ventilation  Hydration status: acceptable

## 2021-03-16 NOTE — PROGRESS NOTES
LOS: 0 days   Patient Care Team:  Provider, No Known as PCP - General    Subjective     Subjective:  Symptoms:  Improved.  (No hematuria, still has flank pain. ).    Pain:  Pain is requiring pain medication.        History taken from: patient chart    Objective     Vital Signs  Temp:  [97.5 °F (36.4 °C)-99.8 °F (37.7 °C)] 97.5 °F (36.4 °C)  Heart Rate:  [107-125] 117  Resp:  [14-20] 18  BP: ()/(52-77) 105/58    Objective:  General Appearance:  In no acute distress.    Vital signs: (most recent): Blood pressure 105/58, pulse 117, temperature 97.5 °F (36.4 °C), temperature source Oral, resp. rate 18, weight 67.9 kg (149 lb 9.6 oz), SpO2 98 %.  Vital signs are normal.  No fever (TMAX 99.8).    Output: Producing urine.    Lungs:  Normal effort.    Chest: Symmetric chest wall expansion.   Abdomen: Abdomen is soft and non-distended.    Neurological: Patient is alert and oriented to person, place and time.    Pupils:  Pupils are equal, round, and reactive to light.    Skin:  Warm and dry.              Results Review:    Lab Results (last 24 hours)     Procedure Component Value Units Date/Time    Urine Culture - Urine, Urine, Catheter [[id number]]  (Abnormal) Collected: 03/15/21 2156    Specimen: Urine, Catheter Updated: 03/16/21 1303     Urine Culture >100,000 CFU/mL Gram Negative Bacilli    Blood Culture - Blood, Arm, Left [[id number]] Collected: 03/16/21 0856    Specimen: Blood from Arm, Left Updated: 03/16/21 0917    Blood Culture - Blood, Hand, Right [[id number]] Collected: 03/16/21 0848    Specimen: Blood from Hand, Right Updated: 03/16/21 0917    Basic Metabolic Panel [[id number]]  (Abnormal) Collected: 03/16/21 0533    Specimen: Blood Updated: 03/16/21 0627     Glucose 107 mg/dL      BUN 13 mg/dL      Creatinine 0.86 mg/dL      Sodium 137 mmol/L      Potassium 4.0 mmol/L      Chloride 103 mmol/L      CO2 25.0 mmol/L      Calcium 8.8 mg/dL      eGFR Non African Amer 70 mL/min/1.73      BUN/Creatinine Ratio 15.1      Anion Gap 9.0 mmol/L     Narrative:      GFR Normal >60  Chronic Kidney Disease <60  Kidney Failure <15      CBC & Differential [[id number]]  (Abnormal) Collected: 03/16/21 0533    Specimen: Blood Updated: 03/16/21 0611    Narrative:      The following orders were created for panel order CBC & Differential.  Procedure                               Abnormality         Status                     ---------                               -----------         ------                     CBC Auto Differential[[id number]]        Abnormal            Final result                 Please view results for these tests on the individual orders.    CBC Auto Differential [[id number]]  (Abnormal) Collected: 03/16/21 0533    Specimen: Blood Updated: 03/16/21 0611     WBC 22.80 10*3/mm3      RBC 4.23 10*6/mm3      Hemoglobin 13.1 g/dL      Hematocrit 38.4 %      MCV 90.8 fL      MCH 31.0 pg      MCHC 34.1 g/dL      RDW 13.8 %      RDW-SD 45.9 fl      MPV 10.3 fL      Platelets 228 10*3/mm3      Neutrophil % 86.5 %      Lymphocyte % 6.7 %      Monocyte % 5.7 %      Eosinophil % 0.1 %      Basophil % 0.4 %      Immature Grans % 0.6 %      Neutrophils, Absolute 19.72 10*3/mm3      Lymphocytes, Absolute 1.53 10*3/mm3      Monocytes, Absolute 1.29 10*3/mm3      Eosinophils, Absolute 0.03 10*3/mm3      Basophils, Absolute 0.09 10*3/mm3      Immature Grans, Absolute 0.14 10*3/mm3      nRBC 0.0 /100 WBC     CBC & Differential [[id number]]  (Abnormal) Collected: 03/15/21 1431    Specimen: Blood Updated: 03/15/21 2153    Narrative:      The following orders were created for panel order CBC & Differential.  Procedure                               Abnormality         Status                     ---------                               -----------         ------                     Scan Slide[[id number]]                                       Final result               CBC Auto Differential[[id number]]        Abnormal            Final result                  Please view results for these tests on the individual orders.    Scan Slide [[id number]] Collected: 03/15/21 1431    Specimen: Blood Updated: 03/15/21 2153     RBC Morphology Normal     WBC Morphology Normal     Platelet Estimate Adequate    COVID-19 and FLU A/B PCR - Swab, Nasopharynx [[id number]] Updated: 03/15/21 2136    Specimen: Swab from Nasopharynx              I reviewed the patient's new clinical results.  I reviewed the patient's new imaging results and agree with the interpretation.  I reviewed the patient's other test results and agree with the interpretation      Assessment/Plan       Right ureteral stone      Assessment & Plan    POD #1 right J-stent placement for proximal hydroureter due to 9 mm proximal ureter stone. Urine culture 3/15/21 gram negative bacilli. On Rocephin. TMax 99.8.   -WBC 24.47-->22.80  -CrCl cannot be calculated (Unknown ideal weight.).   -Cr 0.86    Plan:  J-stent in place, continue antibiotic, await culture results.   PRAVEEN Ramirez  03/16/21  13:29 CDT

## 2021-03-16 NOTE — PROGRESS NOTES
Adult Nutrition  Assessment    Patient Name:  Georgia Loyola  YOB: 1970  MRN: [id number]  Admit Date:  3/15/2021    Assessment Date:  3/16/2021    Comments:  51yo female admit w/ n/v and ureteral stone and is s/p cysto w/ stent placement. No pmh. Reg diet, no intakes available. Current wt 149#.  Pt reports that shes eats 1, maybe 2 meals most days but there are days she doesnt eat at all. No reason for not eating. She reports UBW lower 150s and that she may have lost some weight recently. Discussed importance of good nutrition and reviewed healthy food choices, encourage 2-3 meals/day or several small meals thru day. Attached diet ed to print at d/c. RD to follow hospital course.    Reason for Assessment     Row Name 03/16/21 1251          Reason for Assessment    Diagnosis  other (see comments) urinary issues     Identified At Risk by Screening Criteria  MST SCORE 2+;reduced oral intake over the last month;unintentional loss of 10 lbs or more in the past 2 mos         Nutrition/Diet History     Row Name 03/16/21 1253          Nutrition/Diet History    Typical Food/Fluid Intake  Pt states nkfa, no c/s problems. She reports that shes eats 1, maybe 2 meals most days but there are days she doesnt eat at all. No reason for not eating. She reports UBW lower 150s and that she may have lost some weight recently. Discussed importance of good nutrition and reviewed healthy food choices, encourage 2-3 meals/day or several small meals thru day. Attached diet ed to print at d/c.     Supplemental Drinks/Foods/Additives  declined           Labs/Tests/Procedures/Meds     Row Name 03/16/21 4046          Labs/Procedures/Meds    Lab Results Reviewed  reviewed        Diagnostic Tests/Procedures    Diagnostic Test/Procedure Reviewed  reviewed     Diagnostic Test/Procedures Comments  s/p cysto w/ stent        Medications    Pertinent Medications Reviewed  reviewed           Estimated/Assessed Needs     Row Name  03/16/21 1256          Calculation Measurements    Weight Used For Calculations  67.6 kg (149 lb)        Estimated/Assessed Needs    Additional Documentation  Calorie Requirements (Group);KCAL/KG (Group);Protein Requirements (Group);Fluid Requirements (Group)        Calorie Requirements    Estimated Calorie Requirement (kcal/day)  1700        KCAL/KG    KCAL/KG  25 Kcal/Kg (kcal)     25 Kcal/Kg (kcal)  1689.65        Protein Requirements    Weight Used For Protein Calculations  67.6 kg (149 lb)     Est Protein Requirement Amount (gms/kg)  0.8 gm protein     Estimated Protein Requirements (gms/day)  54.07        Fluid Requirements    Fluid Requirements (mL/day)  1700     RDA Method (mL)  1700         Nutrition Prescription Ordered     Row Name 03/16/21 1258          Nutrition Prescription PO    Current PO Diet  Regular                 Electronically signed by:  Tara Bragg RD  03/16/21 13:00 CDT

## 2021-03-16 NOTE — PROGRESS NOTES
LOS: 0 days     Patient Care Team:  Provider, No Known as PCP - General      Subjective     Right ureteral obstruction with hydronephrosis    Objective       Vital Signs  Temp:  [99.1 °F (37.3 °C)-99.8 °F (37.7 °C)] 99.1 °F (37.3 °C)  Heart Rate:  [107-109] 107  Resp:  [18-20] 20  BP: (106-142)/(59-77) 106/59    Physical Exam:        General Appearance:   Sick     Respiratory:    UNLABORED RESPIRATIONS.     Abdomen:     SOFT.       Genitourinary:  Urine clear     Rectal:     DEFERRED       Results Review:       Imaging Results (Last 24 Hours)     Procedure Component Value Units Date/Time    XR Abdomen KUB [[id number]] Collected: 03/15/21 1847     Updated: 03/15/21 1908    Narrative:      Exam:  KUB    History:  Right ureteral calculus.    Supine film of the abdomen was obtained.    Comparison:  None    Contrast opacifies a minimally to moderately dilated right  collecting system and proximal right ureter.  Contrast present within a normal caliber mid to distal right  ureter and residual contrast in the urinary bladder.  Persistent right nephrogram.  No mechanical bowel obstruction.  No organomegaly.  No acute osseous abnormality.  IUD in place.      Impression:      Conclusion:  Right-sided obstructive uropathy presumably related to an  incompletely obstructing calculus in the proximal right ureter at  the L3 level    67942    Electronically signed by:  Leandro Knapp MD  3/15/2021 7:07 PM CDT  Workstation: [id number]        Lab Results (last 24 hours)     Procedure Component Value Units Date/Time    COVID-19 and FLU A/B PCR - Swab, Nasopharynx [[id number]] Collected: 03/15/21 2000    Specimen: Swab from Nasopharynx Updated: 03/15/21 2006    Basic Metabolic Panel [[id number]]  (Abnormal) Collected: 03/15/21 1431    Specimen: Blood Updated: 03/15/21 1535     Glucose 100 mg/dL      BUN 14 mg/dL      Creatinine 0.92 mg/dL      Sodium 137 mmol/L      Potassium 4.1 mmol/L      Chloride 103 mmol/L      CO2 22.0 mmol/L       Calcium 8.8 mg/dL      eGFR Non African Amer 65 mL/min/1.73      BUN/Creatinine Ratio 15.2     Anion Gap 12.0 mmol/L     Narrative:      GFR Normal >60  Chronic Kidney Disease <60  Kidney Failure <15      CBC & Differential [[id number]]  (Abnormal) Collected: 03/15/21 1431    Specimen: Blood Updated: 03/15/21 1527    Narrative:      The following orders were created for panel order CBC & Differential.  Procedure                               Abnormality         Status                     ---------                               -----------         ------                     Scan Slide[[id number]]                                       In process                 CBC Auto Differential[[id number]]        Abnormal            Final result                 Please view results for these tests on the individual orders.    CBC Auto Differential [[id number]]  (Abnormal) Collected: 03/15/21 1431    Specimen: Blood Updated: 03/15/21 1527     WBC 24.47 10*3/mm3      RBC 4.65 10*6/mm3      Hemoglobin 14.2 g/dL      Hematocrit 42.5 %      MCV 91.4 fL      MCH 30.5 pg      MCHC 33.4 g/dL      RDW 13.5 %      RDW-SD 45.3 fl      MPV 10.2 fL      Platelets 289 10*3/mm3      Neutrophil % 89.3 %      Lymphocyte % 4.1 %      Monocyte % 5.3 %      Eosinophil % 0.0 %      Basophil % 0.4 %      Immature Grans % 0.9 %      Neutrophils, Absolute 21.88 10*3/mm3      Lymphocytes, Absolute 1.00 10*3/mm3      Monocytes, Absolute 1.29 10*3/mm3      Eosinophils, Absolute 0.00 10*3/mm3      Basophils, Absolute 0.09 10*3/mm3      Immature Grans, Absolute 0.21 10*3/mm3      nRBC 0.0 /100 WBC     Scan Slide [[id number]] Collected: 03/15/21 1431    Specimen: Blood Updated: 03/15/21 1527            I reviewed the patient's new clinical results.  I reviewed the patient's new imaging results and agree with the interpretation.  I reviewed the patient's other test results and agree with the interpretation        Assessment/Plan       Right ureteral  stone      Cystoscopy right retrograde ureteroscopy laser lithotripsy J stent placement risk benefits been discussed was to proceed      Kieran Rodríguez MD  03/15/21  20:38 CDT

## 2021-03-17 VITALS
RESPIRATION RATE: 18 BRPM | OXYGEN SATURATION: 98 % | BODY MASS INDEX: 21.15 KG/M2 | WEIGHT: 143.2 LBS | HEART RATE: 104 BPM | DIASTOLIC BLOOD PRESSURE: 67 MMHG | SYSTOLIC BLOOD PRESSURE: 127 MMHG | TEMPERATURE: 98.2 F

## 2021-03-17 PROBLEM — A41.9 SEPSIS (HCC): Status: ACTIVE | Noted: 2021-03-17

## 2021-03-17 PROBLEM — N39.0 E. COLI UTI (URINARY TRACT INFECTION): Status: ACTIVE | Noted: 2021-03-17

## 2021-03-17 PROBLEM — B96.20 E. COLI UTI (URINARY TRACT INFECTION): Status: ACTIVE | Noted: 2021-03-17

## 2021-03-17 LAB
ANION GAP SERPL CALCULATED.3IONS-SCNC: 10 MMOL/L (ref 5–15)
BACTERIA SPEC AEROBE CULT: ABNORMAL
BASOPHILS # BLD AUTO: 0.05 10*3/MM3 (ref 0–0.2)
BASOPHILS NFR BLD AUTO: 0.5 % (ref 0–1.5)
BUN SERPL-MCNC: 11 MG/DL (ref 6–20)
BUN/CREAT SERPL: 16.4 (ref 7–25)
CALCIUM SPEC-SCNC: 8.5 MG/DL (ref 8.6–10.5)
CHLORIDE SERPL-SCNC: 105 MMOL/L (ref 98–107)
CO2 SERPL-SCNC: 23 MMOL/L (ref 22–29)
CREAT SERPL-MCNC: 0.67 MG/DL (ref 0.57–1)
DEPRECATED RDW RBC AUTO: 44.6 FL (ref 37–54)
EOSINOPHIL # BLD AUTO: 0.1 10*3/MM3 (ref 0–0.4)
EOSINOPHIL NFR BLD AUTO: 0.9 % (ref 0.3–6.2)
ERYTHROCYTE [DISTWIDTH] IN BLOOD BY AUTOMATED COUNT: 13.6 % (ref 12.3–15.4)
GFR SERPL CREATININE-BSD FRML MDRD: 93 ML/MIN/1.73
GLUCOSE SERPL-MCNC: 135 MG/DL (ref 65–99)
HCT VFR BLD AUTO: 37.3 % (ref 34–46.6)
HGB BLD-MCNC: 12.5 G/DL (ref 12–15.9)
IMM GRANULOCYTES # BLD AUTO: 0.03 10*3/MM3 (ref 0–0.05)
IMM GRANULOCYTES NFR BLD AUTO: 0.3 % (ref 0–0.5)
LYMPHOCYTES # BLD AUTO: 0.81 10*3/MM3 (ref 0.7–3.1)
LYMPHOCYTES NFR BLD AUTO: 7.6 % (ref 19.6–45.3)
MCH RBC QN AUTO: 30.2 PG (ref 26.6–33)
MCHC RBC AUTO-ENTMCNC: 33.5 G/DL (ref 31.5–35.7)
MCV RBC AUTO: 90.1 FL (ref 79–97)
MONOCYTES # BLD AUTO: 0.7 10*3/MM3 (ref 0.1–0.9)
MONOCYTES NFR BLD AUTO: 6.6 % (ref 5–12)
NEUTROPHILS NFR BLD AUTO: 8.94 10*3/MM3 (ref 1.7–7)
NEUTROPHILS NFR BLD AUTO: 84.1 % (ref 42.7–76)
NRBC BLD AUTO-RTO: 0 /100 WBC (ref 0–0.2)
PLATELET # BLD AUTO: 188 10*3/MM3 (ref 140–450)
PMV BLD AUTO: 10.4 FL (ref 6–12)
POTASSIUM SERPL-SCNC: 3.5 MMOL/L (ref 3.5–5.2)
RBC # BLD AUTO: 4.14 10*6/MM3 (ref 3.77–5.28)
SODIUM SERPL-SCNC: 138 MMOL/L (ref 136–145)
WBC # BLD AUTO: 10.63 10*3/MM3 (ref 3.4–10.8)

## 2021-03-17 PROCEDURE — 80048 BASIC METABOLIC PNL TOTAL CA: CPT | Performed by: UROLOGY

## 2021-03-17 PROCEDURE — 25010000002 MORPHINE PER 10 MG: Performed by: UROLOGY

## 2021-03-17 PROCEDURE — 85025 COMPLETE CBC W/AUTO DIFF WBC: CPT | Performed by: UROLOGY

## 2021-03-17 PROCEDURE — G0378 HOSPITAL OBSERVATION PER HR: HCPCS

## 2021-03-17 RX ORDER — TAMSULOSIN HYDROCHLORIDE 0.4 MG/1
0.4 CAPSULE ORAL DAILY
Qty: 30 CAPSULE | Refills: 0 | Status: SHIPPED | OUTPATIENT
Start: 2021-03-17

## 2021-03-17 RX ORDER — FLUOXETINE HYDROCHLORIDE 20 MG/1
20 CAPSULE ORAL DAILY
Start: 2021-03-17

## 2021-03-17 RX ORDER — CEFDINIR 300 MG/1
300 CAPSULE ORAL 2 TIMES DAILY
Qty: 14 CAPSULE | Refills: 0 | Status: SHIPPED | OUTPATIENT
Start: 2021-03-17 | End: 2021-03-24

## 2021-03-17 RX ORDER — CEFDINIR 300 MG/1
300 CAPSULE ORAL 2 TIMES DAILY
Qty: 14 CAPSULE | Refills: 0 | Status: SHIPPED | OUTPATIENT
Start: 2021-03-17 | End: 2021-03-17 | Stop reason: SDUPTHER

## 2021-03-17 RX ORDER — ACETAMINOPHEN 325 MG/1
650 TABLET ORAL EVERY 4 HOURS PRN
Start: 2021-03-17

## 2021-03-17 RX ORDER — TAMSULOSIN HYDROCHLORIDE 0.4 MG/1
0.4 CAPSULE ORAL DAILY
Qty: 30 CAPSULE | Refills: 0 | Status: SHIPPED | OUTPATIENT
Start: 2021-03-17 | End: 2021-03-17

## 2021-03-17 RX ADMIN — SODIUM CHLORIDE, PRESERVATIVE FREE 10 ML: 5 INJECTION INTRAVENOUS at 07:53

## 2021-03-17 RX ADMIN — MORPHINE SULFATE 4 MG: 4 INJECTION INTRAVENOUS at 06:46

## 2021-03-17 RX ADMIN — SODIUM CHLORIDE 100 ML/HR: 9 INJECTION, SOLUTION INTRAVENOUS at 07:57

## 2021-03-17 RX ADMIN — HYDROCODONE BITARTRATE AND ACETAMINOPHEN 1 TABLET: 7.5; 325 TABLET ORAL at 07:57

## 2021-03-17 RX ADMIN — ACETAMINOPHEN 650 MG: 325 TABLET, FILM COATED ORAL at 11:47

## 2021-03-17 RX ADMIN — TAMSULOSIN HYDROCHLORIDE 0.4 MG: 0.4 CAPSULE ORAL at 07:53

## 2021-03-17 RX ADMIN — FLUOXETINE 20 MG: 20 CAPSULE ORAL at 07:53

## 2021-03-17 NOTE — PLAN OF CARE
Goal Outcome Evaluation:  Plan of Care Reviewed With: patient  Progress: improving  Outcome Summary: VSS. Pt has had prn morphine x1 tonight and prn norco x1 tonight. Awake on and off. Denied n/v tonight. F/C patent to bedside.

## 2021-03-17 NOTE — DISCHARGE INSTR - LAB
[facility] will call you with follow-up appointment. Please call [phone] if you haven't heard from them by tomorrow morning.

## 2021-03-17 NOTE — DISCHARGE SUMMARY
[facility] Medicine Services  DISCHARGE SUMMARY       Date of Admission: 3/15/2021  Date of Discharge:  3/17/2021  Primary Care Physician: Provider, No Known    Presenting Problem/History of Present Illness:  Right ureteral stone [N20.1]       Final Discharge Diagnoses:  Active Hospital Problems    Diagnosis    • E. coli UTI (urinary tract infection)    • Sepsis (CMS/[facility])    • Right ureteral stone        Consults:   Consults     Date and Time Order Name Status Description    3/15/2021  2:08 PM Inpatient Urology Consult Completed           Procedures Performed: Procedure(s):  CYSTOSCOPY RIGHT RETROGRADE PYELOGRAM STENT INSERTION                Pertinent Test Results:   Lab Results (last 24 hours)     Procedure Component Value Units Date/Time    Blood Culture - Blood, Arm, Left [[id number]] Collected: 03/16/21 0856    Specimen: Blood from Arm, Left Updated: 03/17/21 0930     Blood Culture No growth at 24 hours    Blood Culture - Blood, Hand, Right [[id number]] Collected: 03/16/21 0848    Specimen: Blood from Hand, Right Updated: 03/17/21 0930     Blood Culture No growth at 24 hours    Basic Metabolic Panel [[id number]]  (Abnormal) Collected: 03/17/21 0551    Specimen: Blood Updated: 03/17/21 0637     Glucose 135 mg/dL      BUN 11 mg/dL      Creatinine 0.67 mg/dL      Sodium 138 mmol/L      Potassium 3.5 mmol/L      Chloride 105 mmol/L      CO2 23.0 mmol/L      Calcium 8.5 mg/dL      eGFR Non African Amer 93 mL/min/1.73      BUN/Creatinine Ratio 16.4     Anion Gap 10.0 mmol/L     Narrative:      GFR Normal >60  Chronic Kidney Disease <60  Kidney Failure <15      CBC & Differential [[id number]]  (Abnormal) Collected: 03/17/21 0551    Specimen: Blood Updated: 03/17/21 0631    Narrative:      The following orders were created for panel order CBC & Differential.  Procedure                               Abnormality         Status                     ---------                                -----------         ------                     CBC Auto Differential[[id number]]        Abnormal            Final result                 Please view results for these tests on the individual orders.    CBC Auto Differential [[id number]]  (Abnormal) Collected: 03/17/21 0551    Specimen: Blood Updated: 03/17/21 0631     WBC 10.63 10*3/mm3      RBC 4.14 10*6/mm3      Hemoglobin 12.5 g/dL      Hematocrit 37.3 %      MCV 90.1 fL      MCH 30.2 pg      MCHC 33.5 g/dL      RDW 13.6 %      RDW-SD 44.6 fl      MPV 10.4 fL      Platelets 188 10*3/mm3      Neutrophil % 84.1 %      Lymphocyte % 7.6 %      Monocyte % 6.6 %      Eosinophil % 0.9 %      Basophil % 0.5 %      Immature Grans % 0.3 %      Neutrophils, Absolute 8.94 10*3/mm3      Lymphocytes, Absolute 0.81 10*3/mm3      Monocytes, Absolute 0.70 10*3/mm3      Eosinophils, Absolute 0.10 10*3/mm3      Basophils, Absolute 0.05 10*3/mm3      Immature Grans, Absolute 0.03 10*3/mm3      nRBC 0.0 /100 WBC     Urine Culture - Urine, Urine, Catheter [[id number]]  (Abnormal)  (Susceptibility) Collected: 03/15/21 2156    Specimen: Urine, Catheter Updated: 03/17/21 0245     Urine Culture >100,000 CFU/mL Escherichia coli    Susceptibility      Escherichia coli      ANA      Ampicillin Susceptible      Ampicillin + Sulbactam Susceptible      Cefazolin Susceptible      Cefepime Susceptible      Ceftazidime Susceptible      Ceftriaxone Susceptible      Gentamicin Susceptible      Levofloxacin Susceptible      Nitrofurantoin Susceptible      Piperacillin + Tazobactam Susceptible      Tetracycline Susceptible      Trimethoprim + Sulfamethoxazole Susceptible               Linear View                       Imaging Results (All)     Procedure Component Value Units Date/Time    XR Abdomen KUB [[id number]] Collected: 03/16/21 0941     Updated: 03/16/21 0959    Narrative:      PROCEDURE: XR ABDOMEN KUB    VIEWS:  1    INDICATION:Abdominal pain/discomfort    COMPARISON: 3/15/2021    FINDINGS:       - Bowel gas pattern: Nonobstructive    - Free air: None    - Soft tissue:No gross evidence of organomegaly,      an abdominal mass,or ascites    - Calculi: 0.94 cm calculus projected over the right renal  shadow and adjacent to the proximal loop of right-sided double-J  ureteral stent, distal loop of which terminates in the pelvis.  Contrast has drained from the right renal collecting system    - Osseous:Limited assessment, unremarkable for age.    - Misc: IUD present and pelvis        Impression:        1. New right double-J ureteral stent  2. Presumed right nephrolithiasis  3. IUD in pelvis    Electronically signed by:  Nhi Boone MD  3/16/2021 9:58 AM CDT  Workstation: [id number]    FL Retrograde Pyelogram In OR [[id number]] Resulted: 03/16/21 0716     Updated: 03/16/21 0716    XR Abdomen KUB [[id number]] Collected: 03/15/21 1847     Updated: 03/15/21 1908    Narrative:      Exam:  KUB    History:  Right ureteral calculus.    Supine film of the abdomen was obtained.    Comparison:  None    Contrast opacifies a minimally to moderately dilated right  collecting system and proximal right ureter.  Contrast present within a normal caliber mid to distal right  ureter and residual contrast in the urinary bladder.  Persistent right nephrogram.  No mechanical bowel obstruction.  No organomegaly.  No acute osseous abnormality.  IUD in place.      Impression:      Conclusion:  Right-sided obstructive uropathy presumably related to an  incompletely obstructing calculus in the proximal right ureter at  the L3 level    [id number]    Electronically signed by:  Leandro Knapp MD  3/15/2021 7:07 PM CDT  Workstation: [id number]            Chief Complaint on Day of Discharge: none    Hospital Course:  50 year old female with past medical history of anxiety, osteoarthritis, chronic back pain who presented as a transfer from [facility] on 3/15/2021 with complaints of right flank pain, nausea, and vomiting that started  around 8 pm the night prior.  She was admitted for obstructive right ureteral stone and urology was consulted.  Patient was septic on admission with tachycardia and fever noted.  She was treated with Rocephin and underwent cystoscopy and right J stent placement.  With above mentioned treatment, sepsis has resolved and patient is stable on day of discharge.  Case is discussed with Dr. Rodríguez who gives clearance for discharge with instructions for follow up on 3/19/2021 to arrange lithotripsy.  She continues on omnicef at discharge due to growth of E coli on urine culture.  She is also instructed on one week PCP follow up.    Condition on Discharge:  Stable     Physical Exam on Discharge:  /67 (BP Location: Left arm, Patient Position: Lying)   Pulse 104   Temp 98.2 °F (36.8 °C) (Oral)   Resp 18   Wt 65 kg (143 lb 3.2 oz)   SpO2 98%   BMI 21.15 kg/m²   Physical Exam  Vitals and nursing note reviewed.   Constitutional:       General: She is not in acute distress.     Appearance: Normal appearance. She is not ill-appearing.   HENT:      Head: Normocephalic and atraumatic.      Right Ear: External ear normal.      Left Ear: External ear normal.      Nose: Nose normal.      Mouth/Throat:      Mouth: Mucous membranes are moist.      Pharynx: Oropharynx is clear.   Eyes:      General: No scleral icterus.        Right eye: No discharge.         Left eye: No discharge.      Conjunctiva/sclera: Conjunctivae normal.   Cardiovascular:      Rate and Rhythm: Normal rate and regular rhythm.      Pulses: Normal pulses.      Heart sounds: Normal heart sounds. No murmur. No friction rub. No gallop.    Pulmonary:      Effort: Pulmonary effort is normal. No respiratory distress.      Breath sounds: Normal breath sounds. No stridor. No wheezing, rhonchi or rales.   Abdominal:      General: Bowel sounds are normal. There is no distension.      Palpations: Abdomen is soft.      Tenderness: There is no abdominal tenderness.    Musculoskeletal:         General: No swelling. Normal range of motion.      Cervical back: Normal range of motion and neck supple.   Skin:     General: Skin is warm and dry.   Neurological:      General: No focal deficit present.      Mental Status: She is alert and oriented to person, place, and time.   Psychiatric:         Mood and Affect: Mood normal.         Behavior: Behavior normal.        Discharge Disposition:  Home or Self Care    Discharge Medications:     Discharge Medications      New Medications      Instructions Start Date   acetaminophen 325 MG tablet  Commonly known as: TYLENOL   650 mg, Oral, Every 4 Hours PRN      cefdinir 300 MG capsule  Commonly known as: OMNICEF   300 mg, Oral, 2 Times Daily      FLUoxetine 20 MG capsule  Commonly known as: PROzac   20 mg, Oral, Daily      tamsulosin 0.4 MG capsule 24 hr capsule  Commonly known as: FLOMAX   0.4 mg, Oral, Daily         Changes to Medications      Instructions Start Date   HYDROcodone-acetaminophen 7.5-325 MG per tablet  Commonly known as: NORCO  What changed: Another medication with the same name was removed. Continue taking this medication, and follow the directions you see here.   1 tablet, Oral, Every 6 Hours PRN         Continue These Medications      Instructions Start Date   cyclobenzaprine 10 MG tablet  Commonly known as: FLEXERIL   10 mg, Oral, 2 Times Daily PRN             Discharge Diet:   Diet Instructions     Diet: Regular      Discharge Diet: Regular          Activity at Discharge:   Activity Instructions     Activity as Tolerated            Discharge Care Plan/Instructions: Follow up with PCP within one week.  Follow up with Dr. Rodríguez at [facility] on 3/19/2021.    Follow-up Appointments:   Additional Instructions for the Follow-ups that You Need to Schedule     Discharge Follow-up with PCP   As directed       Currently Documented PCP:    Provider, No Known    PCP Phone Number:    None     Follow Up Details: one week          Discharge Follow-up with Specified Provider: [place] ([place] office on Friday 3/19)   As directed      To: [place] ([facility] office on Friday 3/19)        Additional information on Labs and Follow-ups:      [facility] will call you with follow-up appointment. Please call [phone] if you haven't heard from them by tomorrow morning.         Follow-up Information     Provider, No Known Follow up in 1 week(s).    Contact information:  [place] [address]             Provider, No Known .    Contact information:  [place] [phone]                   Test Results Pending at Discharge:   Pending Labs     Order Current Status    Blood Culture - Blood, Arm, Left Preliminary result    Blood Culture - Blood, Hand, Right Preliminary result                This document has been electronically signed by PRAVEEN Smith on March 17, 2021 16:19 CDT        Time: 30 minutes spent on assessment, discussion, management, and discharge planning for this patient.

## 2021-03-21 LAB
BACTERIA SPEC AEROBE CULT: NORMAL
BACTERIA SPEC AEROBE CULT: NORMAL

## (undated) DEVICE — NITINOL WIRE WITH HYDROPHILIC TIP: Brand: SENSOR

## (undated) DEVICE — GLV SURG SIGNATURE ESSENTIAL PF LTX SZ7

## (undated) DEVICE — GW PTFE FIX/CORE FLXTIP .038 3X150CM

## (undated) DEVICE — SYR LUERLOK 20CC BX/50

## (undated) DEVICE — GLV SURG NEOLON 2G PF LF 6.5 STRL

## (undated) DEVICE — SOL IRR H2O BTL 1000ML STRL

## (undated) DEVICE — SOL IRR NACL 0.9PCT 3000ML

## (undated) DEVICE — CATHETER,FOLEY,100%SILICONE,16FR,10ML,LF: Brand: MEDLINE

## (undated) DEVICE — GLV SURG SIGNATURE ESSENTIAL PF LTX SZ7.5

## (undated) DEVICE — STERILE POLYISOPRENE POWDER-FREE SURGICAL GLOVES WITH EMOLLIENT COATING: Brand: PROTEXIS

## (undated) DEVICE — DRAINBAG,ANTI-REFLUX TOWER,L/F,2000ML,LL: Brand: MEDLINE

## (undated) DEVICE — CONTAINER,SPECIMEN,OR STERILE,4OZ: Brand: MEDLINE

## (undated) DEVICE — SAFESECURE,SECUREMENT,FOLEY CATH,STERILE: Brand: MEDLINE

## (undated) DEVICE — GLV SURG NEOLON 2G PF LF 7.5 STRL

## (undated) DEVICE — CATH URETRL OPN/END 5F70CM

## (undated) DEVICE — SOL PVPI SPRY BETADINE 3OZ

## (undated) DEVICE — PK CYSTO LF 60